# Patient Record
Sex: FEMALE | Race: BLACK OR AFRICAN AMERICAN | NOT HISPANIC OR LATINO | Employment: OTHER | URBAN - METROPOLITAN AREA
[De-identification: names, ages, dates, MRNs, and addresses within clinical notes are randomized per-mention and may not be internally consistent; named-entity substitution may affect disease eponyms.]

---

## 2018-07-02 ENCOUNTER — OFFICE VISIT (OUTPATIENT)
Dept: URGENT CARE | Facility: CLINIC | Age: 28
End: 2018-07-02
Payer: MEDICAID

## 2018-07-02 VITALS
HEART RATE: 83 BPM | OXYGEN SATURATION: 98 % | SYSTOLIC BLOOD PRESSURE: 108 MMHG | DIASTOLIC BLOOD PRESSURE: 74 MMHG | BODY MASS INDEX: 26.06 KG/M2 | WEIGHT: 138 LBS | TEMPERATURE: 98 F | RESPIRATION RATE: 18 BRPM | HEIGHT: 61 IN

## 2018-07-02 DIAGNOSIS — B07.9 VIRAL WART ON FINGER: Primary | ICD-10-CM

## 2018-07-02 PROCEDURE — 99203 OFFICE O/P NEW LOW 30 MIN: CPT | Mod: S$GLB,,, | Performed by: NURSE PRACTITIONER

## 2018-07-02 NOTE — PATIENT INSTRUCTIONS
Warts (Nongenital)  Warts are caused by a skin virus. They usually appear on the hands or feet. They are harmless and usually go away in about 2 to 3 years without treatment. With treatment, they go away in 1 to 3 months.  Home care  There are several methods you can use to treat warts at home.  The overnight treatment:  1. Soak affected area in hot water for 3 to 5 minutes. Make sure to test water beforehand so that it is not scalding. You should be able to comfortably place the affected area in water.  2. Trim dead tissue with a pumice stone or other tool your healthcare provider has advised, or that you feel comfortable using.  3. Apply an over-the-counter medicine that has salicylic acid. Cover the wart with an adhesive tape.  4. Repeat every third night as tolerated the wart goes away.  The duct tape method:  · Apply a small piece of duct tape to the wart for 6 days. The tape should cover the entire wart. At the end of the sixth day, remove the tape and soak in warm water. Then scrub the area gently with a pumice stone. Let the wart stay open to air overnight. This can be repeated for up to 2 months.  Banana peel:  · Soak the wart until the skin is soft, and then treat skin with a pumice stone. Apply a banana peel that is slightly larger than the wart. Secure with a bandage. The banana peel will keep the skin moist. The enzymes are thought to help kill the virus that causes warts.  Warts on the hands or feet are not very contagious. This means they are not spread to others by ordinary contact. But chewing or picking at the wart can cause it to spread to other places on your own skin.  Follow-up care  Follow up with your healthcare provider, or as advised. Let your provider know if the wart does not go away after 2 months of the above treatment.  When to seek medical advice  Get medical care right away if any of the following occur:  · A wart appears on the bottom of the foot or on the genitals  · Signs of  infection such as redness, swelling, increased pain, or pus  Date Last Reviewed: 8/1/2016  © 4067-2320 [employer]. [address] [place] [address], [place] [address]. All rights reserved. This information is not intended as a substitute for professional medical care. Always follow your healthcare professional's instructions.        Treating Warts     You and your healthcare provider can discuss whether your warts need to be treated.     You and your healthcare provider can talk about what treatment may be best for your wart or warts. To get rid of your warts, your healthcare provider may need to try more than one type of treatment. The methods described below are often used to treat warts.  Types of treatment  · Do nothing. Most warts will resolve within 2 years, even without treatment. So doing nothing is sometimes a good option. This is particularly true for smaller warts that are not causing symptoms.  · Cryotherapy (liquid nitrogen). This kills skin cells by freezing them. It kills the warts and destroys skin infected by the wart-causing virus. This is done in your healthcare providers office and will cause some discomfort. It may take several treatments over several weeks to get rid of the warts.  · Topical medicines. Prescribed topical medicines can be put on the skin. These are usually applied in the healthcare provider's office. But some prescriptions may be applied at home.  · Over-the-counter (OTC) topical treatments. OTC medicines that most often contain salicylic acid may be an option. These patches, liquids, and creams are used at home. The medicine is applied daily to the wart and nearby skin. It's usually left on overnight. The dead skin is filed down the next day. In 1 to 3 days, the procedure can be repeated. Topical treatments are sometimes combined with cryotherapy.  · Electrodessication and curettage (ED & C).  For this procedure, the healthcare provider applies numbing medicine to the  wart. Then the wart is scraped or cut off. This type of treatment is usually not the first line of therapy.  · Laser surgery.  This can vaporize wart tissue or destroy the blood vessels that feed the wart. This is done in the healthcare provider's office.  · Shots (injections). These can be used to treat warts that dont respond to other treatments, such as stubborn or painful warts around the nails. This is done in the healthcare providers office.  When to seek medical treatment  Its a good idea to have your healthcare provider check your warts. That way your provider can rule out any other skin problems. Sometimes a callous or a corn can look like a wart, but the treatments may differ. Treatment can also provide relief from warts that bleed, burn, hurt, or itch. Genital warts should always be treated. They can spread to other people through sexual contact. And they may cause genital or cervical cancer.  Getting good results  After having your warts treated, new warts may still appear. Dont be discouraged. Warts often come back. See your healthcare provider again to discuss this. Your provider can tell you about the treatments that most likely will help clear your skin of warts.   Date Last Reviewed: 2/1/2017  © 4504-2805 [employer]. [address] [place] [address], [place] [address]. All rights reserved. This information is not intended as a substitute for professional medical care. Always follow your healthcare professional's instructions.      -As we discussed you have been given a referral to follow up with a Dermatologist for cryotherapy to have the wart burned off and removed.  -You can start applying OTC wart removal ointment and pads to the affected area.  I have given you an [facility] doctor referral. If you don't hear from them in a few days call [phone]  -If symptoms worsen go to the ER.

## 2018-07-02 NOTE — PROGRESS NOTES
"Subjective:       Patient ID: Ifrah Valentine is a 28 y.o. female.    Vitals:  height is 5' 1" (1.549 m) and weight is 62.6 kg (138 lb). Her temperature is 98.2 °F (36.8 °C). Her blood pressure is 108/74 and her pulse is 83. Her respiration is 18 and oxygen saturation is 98%.     Chief Complaint: Abscess    The patient complains of left inner thumb skin lesion x 2 months. The lesions has darkened and hardened. Concerns for wart. Denies fever or chills. Single round raised plaque present.       Abscess   Chronicity:  NewProgression Since Onset: gradually worsening  Size:  Less than 2 cm  Location:  Hand  Associated Symptoms: no fever, no chills  Characteristics: dryness, peeling and swelling    Pain Scale:  8/10  Relieved by:  Nothing  Worsened by:  Nothing    Review of Systems   Constitution: Negative for chills and fever.   HENT: Negative for sore throat.    Eyes: Negative for blurred vision.   Cardiovascular: Negative for chest pain.   Respiratory: Negative for shortness of breath.    Skin: Negative for rash.   Musculoskeletal: Negative for back pain and joint pain.   Gastrointestinal: Negative for abdominal pain, diarrhea, nausea and vomiting.   Neurological: Negative for headaches.   Psychiatric/Behavioral: The patient is not nervous/anxious.    All other systems reviewed and are negative.      Objective:      Physical Exam   Constitutional: She is oriented to person, place, and time. She appears well-developed and well-nourished.   HENT:   Head: Normocephalic and atraumatic. Head is without abrasion, without contusion and without laceration.   Right Ear: External ear normal.   Left Ear: External ear normal.   Nose: Nose normal.   Mouth/Throat: Oropharynx is clear and moist.   Eyes: Conjunctivae, EOM and lids are normal. Pupils are equal, round, and reactive to light.   Neck: Trachea normal, full passive range of motion without pain and phonation normal. Neck supple.   Cardiovascular: Normal rate, regular rhythm " and normal heart sounds.    Pulmonary/Chest: Effort normal and breath sounds normal. No stridor. No respiratory distress.   Musculoskeletal: Normal range of motion.   Neurological: She is alert and oriented to person, place, and time.   Skin: Skin is warm, dry and intact. Capillary refill takes less than 2 seconds. Lesion (probable hard elevated lesions to the right thumb. conerns for wart) noted. No abrasion, no bruising, no burn, no ecchymosis, no laceration and no rash noted. No erythema.        Psychiatric: She has a normal mood and affect. Her speech is normal and behavior is normal. Judgment and thought content normal. Cognition and memory are normal.   Nursing note and vitals reviewed.      Assessment:       1. Viral wart on finger        Plan:         Viral wart on finger  -     Ambulatory referral to Dermatology      Patient Instructions       Warts (Nongenital)  Warts are caused by a skin virus. They usually appear on the hands or feet. They are harmless and usually go away in about 2 to 3 years without treatment. With treatment, they go away in 1 to 3 months.  Home care  There are several methods you can use to treat warts at home.  The overnight treatment:  1. Soak affected area in hot water for 3 to 5 minutes. Make sure to test water beforehand so that it is not scalding. You should be able to comfortably place the affected area in water.  2. Trim dead tissue with a pumice stone or other tool your healthcare provider has advised, or that you feel comfortable using.  3. Apply an over-the-counter medicine that has salicylic acid. Cover the wart with an adhesive tape.  4. Repeat every third night as tolerated the wart goes away.  The duct tape method:  · Apply a small piece of duct tape to the wart for 6 days. The tape should cover the entire wart. At the end of the sixth day, remove the tape and soak in warm water. Then scrub the area gently with a pumice stone. Let the wart stay open to air overnight.  This can be repeated for up to 2 months.  Banana peel:  · Soak the wart until the skin is soft, and then treat skin with a pumice stone. Apply a banana peel that is slightly larger than the wart. Secure with a bandage. The banana peel will keep the skin moist. The enzymes are thought to help kill the virus that causes warts.  Warts on the hands or feet are not very contagious. This means they are not spread to others by ordinary contact. But chewing or picking at the wart can cause it to spread to other places on your own skin.  Follow-up care  Follow up with your healthcare provider, or as advised. Let your provider know if the wart does not go away after 2 months of the above treatment.  When to seek medical advice  Get medical care right away if any of the following occur:  · A wart appears on the bottom of the foot or on the genitals  · Signs of infection such as redness, swelling, increased pain, or pus  Date Last Reviewed: 8/1/2016  © 3900-8114 [employer]. [address] [place] [address]. All rights reserved. This information is not intended as a substitute for professional medical care. Always follow your healthcare professional's instructions.        Treating Warts     You and your healthcare provider can discuss whether your warts need to be treated.     You and your healthcare provider can talk about what treatment may be best for your wart or warts. To get rid of your warts, your healthcare provider may need to try more than one type of treatment. The methods described below are often used to treat warts.  Types of treatment  · Do nothing. Most warts will resolve within 2 years, even without treatment. So doing nothing is sometimes a good option. This is particularly true for smaller warts that are not causing symptoms.  · Cryotherapy (liquid nitrogen). This kills skin cells by freezing them. It kills the warts and destroys skin infected by the wart-causing virus. This is done in  your healthcare providers office and will cause some discomfort. It may take several treatments over several weeks to get rid of the warts.  · Topical medicines. Prescribed topical medicines can be put on the skin. These are usually applied in the healthcare provider's office. But some prescriptions may be applied at home.  · Over-the-counter (OTC) topical treatments. OTC medicines that most often contain salicylic acid may be an option. These patches, liquids, and creams are used at home. The medicine is applied daily to the wart and nearby skin. It's usually left on overnight. The dead skin is filed down the next day. In 1 to 3 days, the procedure can be repeated. Topical treatments are sometimes combined with cryotherapy.  · Electrodessication and curettage (ED & C).  For this procedure, the healthcare provider applies numbing medicine to the wart. Then the wart is scraped or cut off. This type of treatment is usually not the first line of therapy.  · Laser surgery.  This can vaporize wart tissue or destroy the blood vessels that feed the wart. This is done in the healthcare provider's office.  · Shots (injections). These can be used to treat warts that dont respond to other treatments, such as stubborn or painful warts around the nails. This is done in the healthcare providers office.  When to seek medical treatment  Its a good idea to have your healthcare provider check your warts. That way your provider can rule out any other skin problems. Sometimes a callous or a corn can look like a wart, but the treatments may differ. Treatment can also provide relief from warts that bleed, burn, hurt, or itch. Genital warts should always be treated. They can spread to other people through sexual contact. And they may cause genital or cervical cancer.  Getting good results  After having your warts treated, new warts may still appear. Dont be discouraged. Warts often come back. See your healthcare provider again to  discuss this. Your provider can tell you about the treatments that most likely will help clear your skin of warts.   Date Last Reviewed: 2/1/2017  © 5829-7328 [employer]. [address] [place] [address], [place] [address]. All rights reserved. This information is not intended as a substitute for professional medical care. Always follow your healthcare professional's instructions.      -As we discussed you have been given a referral to follow up with a Dermatologist for cryotherapy to have the wart burned off and removed.  -You can start applying OTC wart removal ointment and pads to the affected area.  I have given you an [facility] doctor referral. If you don't hear from them in a few days call [phone]  -If symptoms worsen go to the ER.

## 2018-10-15 ENCOUNTER — OFFICE VISIT (OUTPATIENT)
Dept: URGENT CARE | Facility: CLINIC | Age: 28
End: 2018-10-15
Payer: MEDICAID

## 2018-10-15 VITALS
OXYGEN SATURATION: 99 % | SYSTOLIC BLOOD PRESSURE: 121 MMHG | WEIGHT: 138 LBS | BODY MASS INDEX: 26.06 KG/M2 | DIASTOLIC BLOOD PRESSURE: 80 MMHG | TEMPERATURE: 99 F | HEART RATE: 110 BPM | HEIGHT: 61 IN | RESPIRATION RATE: 20 BRPM

## 2018-10-15 DIAGNOSIS — S62.394A CLOSED NONDISPLACED FRACTURE OF OTHER PART OF FOURTH METACARPAL BONE OF RIGHT HAND, INITIAL ENCOUNTER: Primary | ICD-10-CM

## 2018-10-15 DIAGNOSIS — M79.641 RIGHT HAND PAIN: ICD-10-CM

## 2018-10-15 PROCEDURE — 73130 X-RAY EXAM OF HAND: CPT | Mod: RT,S$GLB,, | Performed by: RADIOLOGY

## 2018-10-15 PROCEDURE — 99214 OFFICE O/P EST MOD 30 MIN: CPT | Mod: S$GLB,,, | Performed by: NURSE PRACTITIONER

## 2018-10-15 NOTE — PROGRESS NOTES
"Subjective:       Patient ID: Ifrah Valentine is a 28 y.o. female.    Vitals:  height is 5' 1" (1.549 m) and weight is 62.6 kg (138 lb). Her temperature is 98.5 °F (36.9 °C). Her blood pressure is 121/80 and her pulse is 110. Her respiration is 20 and oxygen saturation is 99%.     Chief Complaint: Finger Injury    About an hour ago pt hit the wall with her right hand and now her knuckles are swollen and her hand in in a lot of pain.      Hand Injury    Her dominant hand is their right hand. The incident occurred less than 1 hour ago. The incident occurred at home. The pain is present in the right hand and right fingers. The quality of the pain is described as aching. The pain is at a severity of 10/10. Pertinent negatives include no chest pain or numbness. Nothing aggravates the symptoms. She has tried nothing for the symptoms.     Review of Systems   Constitution: Negative for weakness and malaise/fatigue.   HENT: Negative for nosebleeds.    Cardiovascular: Negative for chest pain and syncope.   Respiratory: Negative for shortness of breath.    Musculoskeletal: Positive for joint pain and joint swelling. Negative for back pain and neck pain.   Gastrointestinal: Negative for abdominal pain.   Genitourinary: Negative for hematuria.   Neurological: Negative for dizziness and numbness.   All other systems reviewed and are negative.      Objective:      Physical Exam   Constitutional: She is oriented to person, place, and time. She appears well-developed and well-nourished. She is cooperative.  Non-toxic appearance. She does not appear ill. No distress.   HENT:   Head: Normocephalic and atraumatic.   Right Ear: Hearing, tympanic membrane and ear canal normal.   Left Ear: Hearing, tympanic membrane and ear canal normal.   Nose: No mucosal edema, rhinorrhea or nasal deformity. No epistaxis. Right sinus exhibits no maxillary sinus tenderness and no frontal sinus tenderness. Left sinus exhibits no maxillary sinus tenderness " and no frontal sinus tenderness.   Mouth/Throat: Uvula is midline and mucous membranes are normal. No trismus in the jaw. Normal dentition. No uvula swelling. No posterior oropharyngeal erythema.   Eyes: Conjunctivae and lids are normal. Right eye exhibits no discharge. Left eye exhibits no discharge. No scleral icterus.   Sclera clear bilat   Neck: Trachea normal, normal range of motion, full passive range of motion without pain and phonation normal. Neck supple.   Cardiovascular: Normal rate, regular rhythm and normal pulses.   Pulmonary/Chest: No respiratory distress.   Abdominal: Normal appearance. She exhibits no distension, no pulsatile midline mass and no mass. There is no tenderness.   Musculoskeletal: Normal range of motion. She exhibits edema and tenderness. She exhibits no deformity.   Neurological: She is alert and oriented to person, place, and time. She exhibits normal muscle tone. Coordination normal.   Skin: Skin is warm, dry and intact. She is not diaphoretic. No pallor.   Psychiatric: She has a normal mood and affect. Her speech is normal and behavior is normal. Judgment and thought content normal. Cognition and memory are normal.   Nursing note and vitals reviewed.      X-ray Hand 3 View Right    Result Date: 10/15/2018  EXAMINATION: XR HAND COMPLETE 3 VIEW RIGHT CLINICAL HISTORY: Pain in right hand TECHNIQUE: PA, lateral, and oblique views of the right hand were performed. COMPARISON: None FINDINGS: There is fracture involving the distal aspect of the right 4th metacarpal bone with palmar angulation of the distal fracture fragment.  This appears acute.  There is no evidence for dislocation.  Soft tissues are unremarkable.     Fracture of the distal aspect of the right 4th metacarpal bone with palmar angulation of the distal fracture fragment. Electronically signed by: Demetri De Los Santos MD Date:    10/15/2018 Time:    09:52      Ulnar gutter splint applied to right hand--+cms after  application      Assessment:       1. Closed nondisplaced fracture of other part of fourth metacarpal bone of right hand, initial encounter    2. Right hand pain        Plan:       Follow up with Orthopedics  Gave pt [facility] orthopedics number as well  Patient Instructions     Closed Hand Fracture (Adult)  You have a fracture, or broken bone, in your hand. This may be a small crack or chip in the bone. Or it may be a major break with the broken parts pushed out of place. A closed fracture means that the broken bone has not gone through the skin. A hand fracture is treated with a splint or cast. It usually takes 4 to 6 weeks to heal. Severe injuries may require surgery.     Home care  · Keep your arm elevated to reduce pain and swelling. When sitting or lying down, elevate your arm above the level of your heart. You can do this by placing your arm on a pillow that rests on your chest or on a pillow at your side. This is most important during the first 48 hours after injury.  · Apply an ice pack over the injured area for no more than 15 to 20 minutes. Do this every 1 to 2 hours for the first 24 to 48 hours. Continue with ice packs as needed to ease pain and swelling. To make an ice pack, put ice cubes in a plastic bag that seals at the top. Wrap the bag in a clean, thin towel or cloth. Never put ice or an ice pack directly on the skin. You can place the ice pack inside the sling and directly over the cast or splint. As the ice melts, be careful that the cast or splint doesnt get wet.  · Keep the cast or splint completely dry at all times. Bathe with your cast or splint out of the water, protected with 2 large plastic bags. Place 1 bag outside the other. Tape each bag with duct tape at the top end. If a fiberglass cast or splint gets wet, dry it with a hair dryer on a cool setting.  · You may use over-the-counter pain medicine to control pain, unless another pain medicine was prescribed. If you have chronic liver or  kidney disease or ever had a stomach ulcer or GI bleeding, talk with your provider beforeusing these medicines.  Follow-up care  Follow up with your healthcare provider within 1 week, or as advised. This is to be sure the bone is healing properly. If you were given a splint, it may be changed to a cast at your follow-up visit.  If X-rays were taken, you will be told of any new findings that may affect your care.  When to seek medical advice  Call your healthcare provider right away if any of these occur:  · The plaster cast or splint becomes wet or soft  · The fiberglass cast or splint stays wet for more than 24 hours  · The cast has a bad smell  · The plaster cast or splint becomes loose  · There is increased tightness or pain under the cast or splint  · The fingers on your injured hand become swollen, cold, blue, numb, or tingly  Date Last Reviewed: 12/3/2015  © 4138-8108 [employer]. [address] [place] [address]. All rights reserved. This information is not intended as a substitute for professional medical care. Always follow your healthcare professional's instructions.            Closed nondisplaced fracture of other part of fourth metacarpal bone of right hand, initial encounter  -     Ambulatory referral to Orthopedics    Right hand pain  -     X-Ray Hand 3 View Right; Future; Expected date: 10/15/2018

## 2018-10-15 NOTE — PATIENT INSTRUCTIONS
Closed Hand Fracture (Adult)  You have a fracture, or broken bone, in your hand. This may be a small crack or chip in the bone. Or it may be a major break with the broken parts pushed out of place. A closed fracture means that the broken bone has not gone through the skin. A hand fracture is treated with a splint or cast. It usually takes 4 to 6 weeks to heal. Severe injuries may require surgery.     Home care  · Keep your arm elevated to reduce pain and swelling. When sitting or lying down, elevate your arm above the level of your heart. You can do this by placing your arm on a pillow that rests on your chest or on a pillow at your side. This is most important during the first 48 hours after injury.  · Apply an ice pack over the injured area for no more than 15 to 20 minutes. Do this every 1 to 2 hours for the first 24 to 48 hours. Continue with ice packs as needed to ease pain and swelling. To make an ice pack, put ice cubes in a plastic bag that seals at the top. Wrap the bag in a clean, thin towel or cloth. Never put ice or an ice pack directly on the skin. You can place the ice pack inside the sling and directly over the cast or splint. As the ice melts, be careful that the cast or splint doesnt get wet.  · Keep the cast or splint completely dry at all times. Bathe with your cast or splint out of the water, protected with 2 large plastic bags. Place 1 bag outside the other. Tape each bag with duct tape at the top end. If a fiberglass cast or splint gets wet, dry it with a hair dryer on a cool setting.  · You may use over-the-counter pain medicine to control pain, unless another pain medicine was prescribed. If you have chronic liver or kidney disease or ever had a stomach ulcer or GI bleeding, talk with your provider beforeusing these medicines.  Follow-up care  Follow up with your healthcare provider within 1 week, or as advised. This is to be sure the bone is healing properly. If you were given a splint,  it may be changed to a cast at your follow-up visit.  If X-rays were taken, you will be told of any new findings that may affect your care.  When to seek medical advice  Call your healthcare provider right away if any of these occur:  · The plaster cast or splint becomes wet or soft  · The fiberglass cast or splint stays wet for more than 24 hours  · The cast has a bad smell  · The plaster cast or splint becomes loose  · There is increased tightness or pain under the cast or splint  · The fingers on your injured hand become swollen, cold, blue, numb, or tingly  Date Last Reviewed: 12/3/2015  © 3911-8026 [employer]. [address] [place] [address], [place] [address]. All rights reserved. This information is not intended as a substitute for professional medical care. Always follow your healthcare professional's instructions.

## 2018-10-16 ENCOUNTER — PES CALL (OUTPATIENT)
Dept: ADMINISTRATIVE | Facility: CLINIC | Age: 28
End: 2018-10-16

## 2019-12-26 ENCOUNTER — TELEPHONE (OUTPATIENT)
Dept: NEUROLOGY | Facility: CLINIC | Age: 29
End: 2019-12-26

## 2019-12-26 NOTE — TELEPHONE ENCOUNTER
Left message to offer appointment for Jan 9. Advised that if she is having constant seizures she should go to the ER

## 2019-12-26 NOTE — TELEPHONE ENCOUNTER
----- Message from Jerod Spencer sent at 12/26/2019  7:47 AM CST -----  Contact: pt   Pt would like a call back regarding scheduling np appointment pt is having constant seizure and is concerned pt has medicaid but is willing to pay out of pocket. Insisted on message being sent     Pt can be reached at [phone]
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
## 2020-01-09 ENCOUNTER — OFFICE VISIT (OUTPATIENT)
Dept: NEUROLOGY | Facility: CLINIC | Age: 30
End: 2020-01-09
Payer: MEDICAID

## 2020-01-09 VITALS
HEART RATE: 88 BPM | DIASTOLIC BLOOD PRESSURE: 82 MMHG | BODY MASS INDEX: 26.06 KG/M2 | HEIGHT: 61 IN | WEIGHT: 138 LBS | SYSTOLIC BLOOD PRESSURE: 124 MMHG

## 2020-01-09 DIAGNOSIS — R56.9 SEIZURE: Primary | ICD-10-CM

## 2020-01-09 PROCEDURE — 99205 PR OFFICE/OUTPT VISIT, NEW, LEVL V, 60-74 MIN: ICD-10-PCS | Mod: S$PBB,,, | Performed by: PSYCHIATRY & NEUROLOGY

## 2020-01-09 PROCEDURE — 99999 PR PBB SHADOW E&M-EST. PATIENT-LVL II: ICD-10-PCS | Mod: PBBFAC,,, | Performed by: PSYCHIATRY & NEUROLOGY

## 2020-01-09 PROCEDURE — 99999 PR PBB SHADOW E&M-EST. PATIENT-LVL II: CPT | Mod: PBBFAC,,, | Performed by: PSYCHIATRY & NEUROLOGY

## 2020-01-09 PROCEDURE — 99212 OFFICE O/P EST SF 10 MIN: CPT | Mod: PBBFAC | Performed by: PSYCHIATRY & NEUROLOGY

## 2020-01-09 PROCEDURE — 99205 OFFICE O/P NEW HI 60 MIN: CPT | Mod: S$PBB,,, | Performed by: PSYCHIATRY & NEUROLOGY

## 2020-01-09 RX ORDER — LEVETIRACETAM 500 MG/1
500 TABLET ORAL
COMMUNITY
Start: 2019-08-20 | End: 2021-02-03 | Stop reason: SDUPTHER

## 2020-01-11 PROBLEM — R56.9 SEIZURE: Status: ACTIVE | Noted: 2020-01-11

## 2020-01-12 NOTE — ASSESSMENT & PLAN NOTE
28yo F with recent onset of seizures - no risk factors  - triggered by sleep deprivation and possibly etoh    - on LEV 500mg bid - reports side-effects of depression and anger    Plan:   - EMU evaluation to characterize and quantify seizures   - continue LEV 500mg bid for now - until EMU   - change to ZNS - pt wants a q day AED  - avoid triggers  - Sz watch would be appropriate as patient lives alone (with her 5yo daughter)    Seizure log  Seizure precautions/restrictions    Plan of care was discussed in detail with patient and her mother.

## 2020-01-27 ENCOUNTER — TELEPHONE (OUTPATIENT)
Dept: NEUROLOGY | Facility: CLINIC | Age: 30
End: 2020-01-27

## 2020-01-27 NOTE — TELEPHONE ENCOUNTER
Spoke with patient about the need to reschedule her emu. Patient verbalized understanding. Patient upset about the need to change dates.

## 2020-01-30 ENCOUNTER — TELEPHONE (OUTPATIENT)
Dept: NEUROLOGY | Facility: CLINIC | Age: 30
End: 2020-01-30

## 2020-02-03 ENCOUNTER — TELEPHONE (OUTPATIENT)
Dept: NEUROLOGY | Facility: CLINIC | Age: 30
End: 2020-02-03

## 2020-02-04 ENCOUNTER — HOSPITAL ENCOUNTER (INPATIENT)
Facility: HOSPITAL | Age: 30
LOS: 2 days | Discharge: HOME | DRG: 101 | End: 2020-02-06
Attending: PSYCHIATRY & NEUROLOGY | Admitting: PSYCHIATRY & NEUROLOGY
Payer: MEDICAID

## 2020-02-04 DIAGNOSIS — G40.219 COMPLEX PARTIAL EPILEPSY WITH GENERALIZATION AND WITH INTRACTABLE EPILEPSY: ICD-10-CM

## 2020-02-04 DIAGNOSIS — R56.9 SEIZURES: Primary | ICD-10-CM

## 2020-02-04 PROCEDURE — 25000003 PHARM REV CODE 250: Performed by: STUDENT IN AN ORGANIZED HEALTH CARE EDUCATION/TRAINING PROGRAM

## 2020-02-04 PROCEDURE — 95720 PR EEG, W/VIDEO, CONT RECORD, I&R, >12<26 HRS: ICD-10-PCS | Mod: ,,, | Performed by: PSYCHIATRY & NEUROLOGY

## 2020-02-04 PROCEDURE — 95714 VEEG EA 12-26 HR UNMNTR: CPT

## 2020-02-04 PROCEDURE — 95700 EEG CONT REC W/VID EEG TECH: CPT

## 2020-02-04 PROCEDURE — 99223 1ST HOSP IP/OBS HIGH 75: CPT | Mod: ,,, | Performed by: PSYCHIATRY & NEUROLOGY

## 2020-02-04 PROCEDURE — 11000001 HC ACUTE MED/SURG PRIVATE ROOM

## 2020-02-04 PROCEDURE — 95720 EEG PHY/QHP EA INCR W/VEEG: CPT | Mod: ,,, | Performed by: PSYCHIATRY & NEUROLOGY

## 2020-02-04 PROCEDURE — 99223 PR INITIAL HOSPITAL CARE,LEVL III: ICD-10-PCS | Mod: ,,, | Performed by: PSYCHIATRY & NEUROLOGY

## 2020-02-04 PROCEDURE — S4991 NICOTINE PATCH NONLEGEND: HCPCS | Performed by: STUDENT IN AN ORGANIZED HEALTH CARE EDUCATION/TRAINING PROGRAM

## 2020-02-04 RX ORDER — SODIUM CHLORIDE 0.9 % (FLUSH) 0.9 %
10 SYRINGE (ML) INJECTION
Status: DISCONTINUED | OUTPATIENT
Start: 2020-02-04 | End: 2020-02-06 | Stop reason: HOSPADM

## 2020-02-04 RX ORDER — DOCUSATE SODIUM 100 MG/1
100 CAPSULE, LIQUID FILLED ORAL 2 TIMES DAILY PRN
Status: DISCONTINUED | OUTPATIENT
Start: 2020-02-04 | End: 2020-02-06 | Stop reason: HOSPADM

## 2020-02-04 RX ORDER — DOCUSATE SODIUM 100 MG/1
100 CAPSULE, LIQUID FILLED ORAL 2 TIMES DAILY
Status: DISCONTINUED | OUTPATIENT
Start: 2020-02-04 | End: 2020-02-04

## 2020-02-04 RX ORDER — IBUPROFEN 200 MG
1 TABLET ORAL DAILY
Status: DISCONTINUED | OUTPATIENT
Start: 2020-02-04 | End: 2020-02-06 | Stop reason: HOSPADM

## 2020-02-04 RX ORDER — ONDANSETRON 4 MG/1
4 TABLET, FILM COATED ORAL EVERY 6 HOURS PRN
Status: DISCONTINUED | OUTPATIENT
Start: 2020-02-04 | End: 2020-02-06 | Stop reason: HOSPADM

## 2020-02-04 RX ORDER — LORAZEPAM 2 MG/ML
2 INJECTION INTRAMUSCULAR
Status: DISCONTINUED | OUTPATIENT
Start: 2020-02-04 | End: 2020-02-06 | Stop reason: HOSPADM

## 2020-02-04 RX ORDER — ACETAMINOPHEN 325 MG/1
650 TABLET ORAL EVERY 6 HOURS PRN
Status: DISCONTINUED | OUTPATIENT
Start: 2020-02-04 | End: 2020-02-06 | Stop reason: HOSPADM

## 2020-02-04 RX ADMIN — NICOTINE 1 PATCH: 14 PATCH TRANSDERMAL at 06:02
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
## 2020-02-04 NOTE — ASSESSMENT & PLAN NOTE
-Loaded with Keppra, starting her home dose 500 mg bid while titrating up on lamotrigine  -Lamotrigine 25 mg qd x 2 weeks, followed by 50 mg qd x 2 weeks, 100 mg qd x 2 weeks, then 200 mg qd thereafter.  Patient given calendar for titration schedule.   -Continue vEEG  -Continue seizure precautions  -IV lorazepam 2 mg prn generalized seizure > 5 minutes or multiple seizures w/o return to cognitive baseline within 30 minutes  -Follow up with Dr. Post

## 2020-02-05 PROCEDURE — 95720 EEG PHY/QHP EA INCR W/VEEG: CPT | Mod: ,,, | Performed by: PSYCHIATRY & NEUROLOGY

## 2020-02-05 PROCEDURE — 25000003 PHARM REV CODE 250: Performed by: STUDENT IN AN ORGANIZED HEALTH CARE EDUCATION/TRAINING PROGRAM

## 2020-02-05 PROCEDURE — 11000001 HC ACUTE MED/SURG PRIVATE ROOM

## 2020-02-05 PROCEDURE — 95714 VEEG EA 12-26 HR UNMNTR: CPT

## 2020-02-05 PROCEDURE — 99233 PR SUBSEQUENT HOSPITAL CARE,LEVL III: ICD-10-PCS | Mod: ,,, | Performed by: PSYCHIATRY & NEUROLOGY

## 2020-02-05 PROCEDURE — 63600175 PHARM REV CODE 636 W HCPCS: Performed by: STUDENT IN AN ORGANIZED HEALTH CARE EDUCATION/TRAINING PROGRAM

## 2020-02-05 PROCEDURE — 94761 N-INVAS EAR/PLS OXIMETRY MLT: CPT

## 2020-02-05 PROCEDURE — S4991 NICOTINE PATCH NONLEGEND: HCPCS | Performed by: STUDENT IN AN ORGANIZED HEALTH CARE EDUCATION/TRAINING PROGRAM

## 2020-02-05 PROCEDURE — 95720 PR EEG, W/VIDEO, CONT RECORD, I&R, >12<26 HRS: ICD-10-PCS | Mod: ,,, | Performed by: PSYCHIATRY & NEUROLOGY

## 2020-02-05 PROCEDURE — 99233 SBSQ HOSP IP/OBS HIGH 50: CPT | Mod: ,,, | Performed by: PSYCHIATRY & NEUROLOGY

## 2020-02-05 PROCEDURE — 95700 EEG CONT REC W/VID EEG TECH: CPT

## 2020-02-05 RX ORDER — HYDROCODONE BITARTRATE AND ACETAMINOPHEN 5; 325 MG/1; MG/1
1 TABLET ORAL EVERY 6 HOURS PRN
Status: DISCONTINUED | OUTPATIENT
Start: 2020-02-05 | End: 2020-02-06 | Stop reason: HOSPADM

## 2020-02-05 RX ORDER — MIDAZOLAM HYDROCHLORIDE 5 MG/ML
5 INJECTION INTRAMUSCULAR; INTRAVENOUS ONCE
Status: COMPLETED | OUTPATIENT
Start: 2020-02-05 | End: 2020-02-05

## 2020-02-05 RX ORDER — KETOROLAC TROMETHAMINE 30 MG/ML
30 INJECTION, SOLUTION INTRAMUSCULAR; INTRAVENOUS ONCE
Status: COMPLETED | OUTPATIENT
Start: 2020-02-05 | End: 2020-02-05

## 2020-02-05 RX ORDER — LEVETIRACETAM 500 MG/1
500 TABLET ORAL 2 TIMES DAILY
Status: DISCONTINUED | OUTPATIENT
Start: 2020-02-05 | End: 2020-02-06 | Stop reason: HOSPADM

## 2020-02-05 RX ORDER — LAMOTRIGINE 25 MG/1
25 TABLET ORAL DAILY
Status: DISCONTINUED | OUTPATIENT
Start: 2020-02-05 | End: 2020-02-06 | Stop reason: HOSPADM

## 2020-02-05 RX ADMIN — ONDANSETRON HYDROCHLORIDE 4 MG: 4 TABLET, FILM COATED ORAL at 05:02

## 2020-02-05 RX ADMIN — NICOTINE 1 PATCH: 14 PATCH TRANSDERMAL at 09:02

## 2020-02-05 RX ADMIN — ACETAMINOPHEN 650 MG: 325 TABLET ORAL at 02:02

## 2020-02-05 RX ADMIN — LAMOTRIGINE 25 MG: 25 TABLET ORAL at 08:02

## 2020-02-05 RX ADMIN — LEVETIRACETAM 500 MG: 500 TABLET ORAL at 08:02

## 2020-02-05 RX ADMIN — MIDAZOLAM HYDROCHLORIDE 5 MG: 5 INJECTION, SOLUTION INTRAMUSCULAR; INTRAVENOUS at 10:02

## 2020-02-05 RX ADMIN — KETOROLAC TROMETHAMINE 30 MG: 30 INJECTION, SOLUTION INTRAMUSCULAR; INTRAVENOUS at 10:02

## 2020-02-05 RX ADMIN — ACETAMINOPHEN 650 MG: 325 TABLET ORAL at 09:02

## 2020-02-05 RX ADMIN — ONDANSETRON HYDROCHLORIDE 4 MG: 4 TABLET, FILM COATED ORAL at 09:02
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
## 2020-02-05 NOTE — HOSPITAL COURSE
02/04/20-02/05/20:  Seizure overnight ~ 03:26 AM 02/05/20.  Additional seizures after 7 am.  R temporal onset with generalization.  IV midazolam given, starting lamotrigine but will continue levetiracetam until up to appropriate dose on lamotrigine.  02/05/20-02/06/20:  Another generalized seizure 18:27 with some additional seizures early morning 2/6--5:50, 6:20.

## 2020-02-05 NOTE — PLAN OF CARE
PT RESTING AT THIS TIME. DIFFICULTY TO AROUSE AT THIS TIME. WILL RETURN LATER.       02/05/20 1244   Discharge Assessment   Assessment Type Discharge Planning Assessment   Assessment information obtained from? Medical Record

## 2020-02-05 NOTE — PLAN OF CARE
Problem: Adult Inpatient Plan of Care  Goal: Plan of Care Review  Outcome: Ongoing, Progressing     Problem: Adult Inpatient Plan of Care  Goal: Optimal Comfort and Wellbeing  Outcome: Ongoing, Progressing

## 2020-02-05 NOTE — PROGRESS NOTES
[facility]  Epilepsy Monitoring Unit  Daily Progress Note    Date: 1/9/20  Patient Name: Ifrah Valentine   MRN: [id number]   PCP: Primary Doctor No    Subjective:     Interval History:  Patient had a seizure around 03:26 am last night then two more events this morning, after 7 am and will be reported with tomorrow's EEG report.  Events with R temporal focus.  Discussed starting lamotrigine.  Parents and sister at bedside.  Patient given IV midazolam due to several seizures within the course of ~ 4 hours.  Will follow up with Dr. Post.    HOSPITAL COURSE:  02/04/20-02/05/20:  Seizure overnight ~ 03:26 AM 02/05/20.  Additional seizures after 7 am.  R temporal onset with generalization.  IV midazolam given, starting lamotrigine but will continue levetiracetam until up to appropriate dose on lamotrigine.    PAST MEDICAL HISTORY:  Past Medical History:   Diagnosis Date    Idiopathic generalized epilepsy     Onset April 2019     PAST SURGICAL HISTORY:  History reviewed. No pertinent surgical history.  CURRENT MEDS:  Current Facility-Administered Medications   Medication Dose Route Frequency Provider Last Rate Last Dose    acetaminophen tablet 650 mg  650 mg Oral Q6H PRN Padmini Ch MD   650 mg at 02/05/20 0929    docusate sodium capsule 100 mg  100 mg Oral BID PRN Padmini Ch MD        HYDROcodone-acetaminophen 5-325 mg per tablet 1 tablet  1 tablet Oral Q6H PRN Padmini Ch MD        lamoTRIgine tablet 25 mg  25 mg Oral Daily Padmini Ch MD        levETIRAcetam tablet 500 mg  500 mg Oral BID Padmini Ch MD        lorazepam injection 2 mg  2 mg Intravenous Q15 Min PRN Padmini Ch MD        nicotine 14 mg/24 hr 1 patch  1 patch Transdermal Daily Padmini Ch MD   1 patch at 02/05/20 0935    ondansetron tablet 4 mg  4 mg Oral Q6H PRN Padmini Ch MD   4 mg at 02/05/20 1717    sodium chloride  0.9% flush 10 mL  10 mL Intravenous PRN Padmini Ch MD         ALLERGIES:  Review of patient's allergies indicates:  No Known Allergies  FAMILY HISTORY:  History reviewed. No pertinent family history.  SOCIAL HISTORY:  Social History     Tobacco Use    Smoking status: Current Every Day Smoker     Packs/day: 0.50     Years: 10.00     Pack years: 5.00     Types: Cigarettes    Smokeless tobacco: Never Used   Substance Use Topics    Alcohol use: Yes     Frequency: 2-4 times a month     Drinks per session: 3 or 4     Binge frequency: Never    Drug use: Yes     Frequency: 3.0 times per week     Types: Marijuana     Review of Systems:  12 system review of systems is negative except as noted in HPI.      Objective:     Vitals:    02/05/20 1104 02/05/20 1231 02/05/20 1239 02/05/20 1506   BP:   (!) 103/56    BP Location:       Patient Position:       Pulse: 78 79 62 74   Resp:  16 18    Temp:   98.5 °F (36.9 °C)    TempSrc:   Oral    SpO2:  98% 96%      General: NAD, well nourished   Eyes: no tearing, discharge, no erythema   ENT: moist mucous membranes of the oral cavity, no erythema or exudate; +tongue lac  Neck: Supple, Full range of motion without nuchal rigidity  Cardiovascular: Warm and well perfused, no LE edema  Lungs: Normal work of breathing, normal chest wall excursions  Skin: No rash, lesions, or breakdown on exposed skin  Psychiatry: Mood and affect are appropriate   Abdomen: soft, non distended  Extremeties: No cyanosis, clubbing or edema.    Neurological   MENTAL STATUS: Somnolent s/p seizure activity this am.  Oriented to self, location, date.  Follows commands.  CRANIAL NERVES: PERRL. EOMI. Facial movement intact, symmetric. Hearing grossly intact. Full shoulder shrug bilaterally. Tongue protrudes midline, palate raises symmetrically.  SENSORY: Sensation is intact to light touch throughout.   MOTOR: Normal bulk and tone. No pronator drift. Poor effort 2/2 muscle soreness but 5/5 throughout  with best effort.   CEREBELLAR/COORDINATION/GAIT: Gait deferred 2/2 seizure risk.  No resting tremor or myoclonus.    Labs: Reviewed. Noted below.  Imaging: Reviewed imaging prior to admission per H&P.  No imaging on current admission.    Diagnostic EEG Results:  02/04/20-02/05/20 Date: EEG Background: Continuous, symmetric background with no clear interictal discharges.  Event 03:26 with R temporal onset and secondary generalization.     Activation Procedures: None    Seizures/Events: 02/05/20 03:26:14  Start Time: 02/05/20 03:26:14, Stop Time 03:28:04  Vitals:  Stable  Clinical Symptoms:  Patient sleeping, shaking in RUE with eyes open, head turn L w/ ictal cry and figure four posturing.  Unable to see BLE, reportedly extended.  Subjective Symptoms:  Pt does not remember seizure, has tongue lac after    Assessment:      This is Ifrah Valentine, 30 y.o. female presenting for evaluation of seizure focus and AED adjustment.    Plan:      Problem List as of 2/5/2020 Reviewed: 1/11/2020  9:17 PM by Monisha Post MD       Unprioritized    Seizures    Complex partial epilepsy with generalization and with intractable epilepsy    Current Assessment & Plan 1/9/2020 Hospital Encounter Written 2/4/2020  4:12 PM by Padmini Ch MD     -Concern for idiopathic generalized epilepsy with unknown seizure burden, not responding to Keppra  -Resuming levetiracetam with plan to titrate up on lamotrigine prior to weaning off of Keppra  -Continue vEEG  -Continue seizure precautions  -IV lorazepam 2 mg prn generalized seizure > 5 minutes or multiple seizures w/o return to cognitive baseline within 30 minutes  -If stable on EEG and clinically x 24 hours, will plan for discharge 2/6/20 with lamotrigine, levetiracetam, and ativan bridge with follow up planned w/ Dr. Post

## 2020-02-05 NOTE — PROCEDURES
EPILEPSY MONITORING UNIT  EEG/VIDEO TELEMETRY REPORT  DATE OF SERVICE: 2/4-6/2020  EEG NUMBER: EMU -1,2,3,4  REQUESTED BY:   LOCATION OF SERVICE:     METHODOLOGY      Electroencephalographic (EEG) is recorded with electrodes placed according to the International 10-20 placement system.  Thirty Two (32) channels of digital signal including the T1 and T2 electrodes are simultaneously recorded from the scalp and also including EKG, EMG  and/or eye movement monitors.  Recording band pass was 0.1 to 512 hz.  Digital video recording of the patient is simultaneously recorded with the EEG.  The patient is instructed report clinical symptoms which may occur during the recording session.  EEG and video recording is stored and archived in digital format. Activation procedures which include photic stimulation, hyperventilation and instructing patients to perform simple task are done in selected patients.       The EEG is displayed on a monitor screen and can be reformatted into different montages for evaluation.  The entire recoding is submitted for computer assisted analysis to detect spike and electrographic seizure activity.  The entire recording is visually reviewed and the times identified by computer analysis as being spikes or seizures are reviewed again.  Compresses spectral analysis (CSA) is also performed on the activity recorded from each individual channel.  This is displayed as a power display of frequencies from 0 to 30 Hz over time.   The CSA analysis is done and displayed continuously.  This is reviewed for asymmetries in power between homologous areas of the scalp and for presence of changes in power which can be seen when seizures occur.  Sections of suspected abnormalities on the CSA is then compared with the original EEG recording.      [employer] software was also utilized in the review of this study.  This software suite analyzes the EEG recording in multiple domains.  Coherence and rhythmicity is  computed to identify EEG sections which may contain organized seizures.  Each channel undergoes analysis to detect presence of spike and sharp waves which have special and morphological characteristic of epileptic activity.  The routine EEG recording is converted from spacial into frequency domain.  This is then displayed comparing homologous areas to identify areas of significant asymmetry.  Algorithm to identify non-cortically generated artifact is used to separate eye movement, EMG and other artifact from the EEG.      ELECTROENCEPHALOGRAM  DAY 1:  Recording Times  Start on 2/4/2020 at 15:34:44 stop on 2/5/2020 at 08:37:57  A total of 16:51:32 hours of EEG/Video telemetry was recorded.  Events/Seizures recorded  Seizure 1 - on 2/5/2020 start at 03:26:22 stopped 03:27:22  Seizure 2 - on 2/5/2020 start at 07:57:55 stopped 07:29:41  Background activity:   The background rhythm was characterized by alpha and anterior dominant beta activity with a 10-11Hz posterior dominant alpha rhythm at 30-70 microvolts.   Symmetry and continuity: the background was continuous and symmetric  Sleep:   Normal sleep transients including sleep spindles, K complexes, vertex waves and POSTS were seen.  Activation procedures:   None  Abnormal activity:   There are occasional right anterior temporal sharp waves at T2, F8 in sleep.  CLINICAL DESCRIPTION OF EVENTS/SEIZURES:  Seizure 1:  Patient is lying in bed on her right side without face visible when she develops forced turn of head and body to the left prior to generalized convulsion.  On EEG there is onset of rhythmic delta activity in the right anterior temporal region with evolution and secondary generalization.  Seizure 2:  Patient is recumbent in bed with both hand on her head talking to the nurse when she begins chewing motions as well as right hand automatisms and becomes verbally unresponsive prior to forced head turn to the left and generalized convulsions.  On EEG there is  onset of rhythmic delta activity in the right anterior temporal region with evolution and secondary generalization.    DAY 2:  Recording Times  Start on 2/5/2020 at 10:02:17 stop on 2/6/2020 at 07:00:01  A total of 20:11:30 hours of EEG/Video telemetry was recorded.  Events/Seizures recorded  Seizure 3 - on 2/5/2020 start at 17:52:19 stopped 17:53:00  Seizure 4 - on 2/5/2020 start at 18:20:08 stopped 18:23:27  Seizure 5 - on 2/5/2020 start at 18:53:36 stopped 18:55:15  Background activity:   The background rhythm was characterized by alpha and anterior dominant beta activity with a 10-11Hz posterior dominant alpha rhythm at 30-70 microvolts.   Symmetry and continuity: the background was continuous and symmetric  Sleep:   Normal sleep transients including sleep spindles, K complexes, vertex waves and POSTS were seen.  Activation procedures:   None  Abnormal activity:   There are occasional right anterior temporal sharp waves at T2, F8 in sleep.  CLINICAL DESCRIPTION OF EVENTS/SEIZURES:  Seizure 3 - The patient is asleep on her right side when she opens her eyes with some arm movements noted under blanket.  Mouth is not visible making it unclear if there are associated oral automatisms.  Seizure 4 - The patient is asleep on her right side at onset when she awakens and abruptly looks left well into the discharge.  Head briefly relaxes before again turning left an transitioning to generalized convulsion with suggestion of figure four posturing under the blanket with left arm extended.  Seizure 5 - The patient opens eyes  from sleep and begins lip smacking then slow head turn left followed by ictal cry and generalized convulsion.   ELECTROGRAPHIC:  Seizure 3 - There is associated right temporal rhythmic theta activity.  Seizure 4 - There is associated right temporal rhythmic theta activity for approximately a minute prior to diffuse muscle artifact consistent with generalization.  Seizure 5 - Following electrographic  sleep there is several seconds of recording obscured by muscle artifact before development of rhythmic theta activity in the right temporal leads transitioning into diffuse muscle artifact consistent with generalization.    DAY 3:  Recording Times  Start on 2/6/2020 at 07:01:35 stop on 2/6/2020 at 08:53:21  A total of 1:51:46 hours of EEG/Video telemetry was recorded.  Events/Seizures recorded  None  Background activity:   The background rhythm was characterized by alpha and anterior dominant beta activity with a 10-11Hz posterior dominant alpha rhythm at 30-70 microvolts.   Symmetry and continuity: the background was continuous and symmetric  Sleep:   Normal sleep transients including sleep spindles, K complexes, vertex waves and POSTS were seen.  Activation procedures:   None  Abnormal activity:   There are occasional right anterior temporal sharp waves at T2, F8 in sleep.  CLINICAL DESCRIPTION OF EVENTS/SEIZURES:  None    FINAL SUMMARY  This abnormal two day video EEG recorded four of the patient's typical convulsions and one staring spell, all with EEG correlate consistent with focal onset seizures originating in the right temporal lobe.  Semiology in all seizures was consistent with temporal lobe onset and all convulsions lateralized to the right hemisphere.  Interictal activity indicated a seizure focus in the right anterior temporal lobe as well.      Prakash Brooks M.D.

## 2020-02-05 NOTE — NURSING
Pt had and event lasting 30 sec Was not in the room to witness the even. Pt bit the side of her tongue had evidence of vomiting or spitting on her bed padding. Could not answer at first where she was. She could give a thumbs up a min later. VS documented. WCTM

## 2020-02-05 NOTE — NURSING
At 328 am pt had an event. Last 2-3 mins. I witness the pt   she bit her tongue and  heart rate elevated. She was put side laying position suctioned and oxygen administered. After event pt was only oriented to self. She couldn't answer where she was the year or month. At 341 am  I asked if she know where she was she told me the hospital.  Couldn't say month or year.

## 2020-02-06 VITALS
RESPIRATION RATE: 16 BRPM | HEART RATE: 79 BPM | SYSTOLIC BLOOD PRESSURE: 101 MMHG | DIASTOLIC BLOOD PRESSURE: 61 MMHG | OXYGEN SATURATION: 95 % | TEMPERATURE: 98 F

## 2020-02-06 PROBLEM — R56.9 SEIZURES: Status: RESOLVED | Noted: 2020-01-11 | Resolved: 2020-02-06

## 2020-02-06 PROCEDURE — 25000003 PHARM REV CODE 250: Performed by: STUDENT IN AN ORGANIZED HEALTH CARE EDUCATION/TRAINING PROGRAM

## 2020-02-06 PROCEDURE — 63600175 PHARM REV CODE 636 W HCPCS: Performed by: STUDENT IN AN ORGANIZED HEALTH CARE EDUCATION/TRAINING PROGRAM

## 2020-02-06 PROCEDURE — S4991 NICOTINE PATCH NONLEGEND: HCPCS | Performed by: STUDENT IN AN ORGANIZED HEALTH CARE EDUCATION/TRAINING PROGRAM

## 2020-02-06 PROCEDURE — 95813 EEG EXTND MNTR 61-119 MIN: CPT | Mod: 26,,, | Performed by: PSYCHIATRY & NEUROLOGY

## 2020-02-06 PROCEDURE — 99239 HOSP IP/OBS DSCHRG MGMT >30: CPT | Mod: ,,, | Performed by: PSYCHIATRY & NEUROLOGY

## 2020-02-06 PROCEDURE — 99239 PR HOSPITAL DISCHARGE DAY,>30 MIN: ICD-10-PCS | Mod: ,,, | Performed by: PSYCHIATRY & NEUROLOGY

## 2020-02-06 PROCEDURE — 95813 PR EEG, EXTENDED, 61-119 MINS: ICD-10-PCS | Mod: 26,,, | Performed by: PSYCHIATRY & NEUROLOGY

## 2020-02-06 RX ORDER — LAMOTRIGINE 50 MG/1
50 TABLET, EXTENDED RELEASE ORAL DAILY
Qty: 14 TABLET | Refills: 0 | Status: SHIPPED | OUTPATIENT
Start: 2020-02-06 | End: 2021-02-05

## 2020-02-06 RX ORDER — LAMOTRIGINE 25 MG/1
25 TABLET ORAL DAILY
Qty: 14 TABLET | Refills: 0 | Status: SHIPPED | OUTPATIENT
Start: 2020-02-07 | End: 2021-02-06

## 2020-02-06 RX ORDER — LORAZEPAM 2 MG/ML
2 INJECTION INTRAMUSCULAR ONCE
Status: DISCONTINUED | OUTPATIENT
Start: 2020-02-06 | End: 2020-02-06

## 2020-02-06 RX ORDER — LAMOTRIGINE 100 MG/1
TABLET ORAL
Qty: 60 TABLET | Refills: 11 | Status: SHIPPED | OUTPATIENT
Start: 2020-02-06 | End: 2020-11-18 | Stop reason: SDUPTHER

## 2020-02-06 RX ORDER — LORAZEPAM 2 MG/ML
2 INJECTION INTRAMUSCULAR ONCE
Status: COMPLETED | OUTPATIENT
Start: 2020-02-06 | End: 2020-02-06

## 2020-02-06 RX ADMIN — LORAZEPAM 2 MG: 2 INJECTION INTRAMUSCULAR; INTRAVENOUS at 10:02

## 2020-02-06 RX ADMIN — LAMOTRIGINE 25 MG: 25 TABLET ORAL at 10:02

## 2020-02-06 RX ADMIN — LEVETIRACETAM 2000 MG: 750 TABLET ORAL at 10:02

## 2020-02-06 RX ADMIN — NICOTINE 1 PATCH: 14 PATCH TRANSDERMAL at 10:02
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
## 2020-02-06 NOTE — PLAN OF CARE
02/06/20 1301   Discharge Assessment   Assessment Type Discharge Planning Assessment   Confirmed/corrected address and phone number on facesheet? Yes   Assessment information obtained from? Patient   Expected Length of Stay (days) 2   Communicated expected length of stay with patient/caregiver yes   Prior to hospitilization cognitive status: Alert/Oriented   Prior to hospitalization functional status: Independent   Current cognitive status: Alert/Oriented   Current Functional Status: Independent   Lives With child(torri), dependent   Able to Return to Prior Arrangements yes   Is patient able to care for self after discharge? Yes   Who are your caregiver(s) and their phone number(s)? Madison Tao (mother) [phone]   Patient's perception of discharge disposition home or selfcare   Readmission Within the Last 30 Days no previous admission in last 30 days   Patient currently being followed by outpatient case management? No   Patient currently receives any other outside agency services? No   Equipment Currently Used at Home none   Do you have any problems affording any of your prescribed medications? No   Is the patient taking medications as prescribed? yes   Does the patient have transportation home? Yes   Transportation Anticipated family or friend will provide  (mom)   Does the patient receive services at the [facility] Clinic? No   Discharge Plan A Home   Discharge Plan B Home   Patient/Family in Agreement with Plan yes     CM met with patient in room for Discharge Planning Assessment.  Per patient,  patient lives with dependent child in a townhouse with 0 steps to enter.   Per patient, patient was independent with ADLS and used no DME for ambulation.  Per patient, the patient will have assistance from famiy upon discharge.   Discharge Planning Booklet given to patient/family and discussed.  All questions addressed.         PCP:  Primary Doctor No      Pharmacy:    [id number]  [place] [id number] [facility]  [address]  [place] [address]  Phone: [phone] Fax: [phone]        Emergency Contacts:  Extended Emergency Contact Information  Primary Emergency Contact: Madison Tao   [facility] Phone: [phone]  Relation: Mother      Insurance:    Payor: MEDICAID / Plan: [facility] [employer] [facility] ([place] MEDICAID) / Product Type: Managed Medicaid /       Kathya Molina RN  Case Management  Ext: [phone]  02/06/2020  1:03 PM

## 2020-02-06 NOTE — NURSING
"When trying to perform assessment, patient only allowed me to take vital signs. Patient continuously pulled blankets over her head when I told her I needed to asses her. Patient allowed me to flush IV; IV was bad and popped out. I told patient I will have to start another IV and explained it was necessary to have IV access to administer IV meds during an event. She stated "no, I'm tired." I returned to her room at 2200 to attempt to insert IV again, to which she refused again and then pulled her blankets over her head and rolled over. PCT informed me that she attempted to fix telemetry leads and the patient refused this as well. Patient was educated on why telemetry monitoring and IV are necessary and she continues to refuse.  WCTM.   "

## 2020-02-06 NOTE — PLAN OF CARE
Patient AAOx4. POC and meds reviewed with patient. V/S stable throughout shift; please see flowsheets for V/S information and full assessment data. Patient refusing care overnight. NAEO. Siderails up x 4 & padded, bed locked in lowest position, call bell in reach, O2 & suction at bedside, TM.

## 2020-02-06 NOTE — PLAN OF CARE
02/06/20 1304   Final Note   Assessment Type Final Discharge Note   Anticipated Discharge Disposition Home   Hospital Follow Up  Appt(s) scheduled? No   Discharge plans and expectations educations in teach back method with documentation complete? No   Right Care Referral Info   Post Acute Recommendation No Care   Post-Acute Status   Post-Acute Authorization Other   Other Status No Post-Acute Service Needs   Discharge Delays None known at this time     Patient medically ready for discharge to home.     No future appointments.    Kathya Molina RN  Case Management  Ext: [phone]  02/06/2020  1:04 PM

## 2020-02-06 NOTE — NURSING
RN witnessed pt actively seizure, turned pt to right side and suctioned pt. See v/s in flowsheet. Will continue to monitor.

## 2020-02-06 NOTE — NURSING
"Patient is a 31yo female admitted for seizure monitoring. Patient laying supine in bed HOB 20-30. Patient cooperative but flat. This nurse was able to do vitals but unable to complete full head to toe. Patient did not eat breakfast stating "I dont have an appetite." Patient verbalized readiness to be discharged home. Mom at bedside when drs made rounds and discussed discharged including new medications. This nurse entered room to discharge pt. Patient   "

## 2020-02-06 NOTE — DISCHARGE INSTRUCTIONS
Please call the Epilepsy Clinic for any questions or concerns.  You can call [phone] and tell them you are one of Dr. Monisha Post's patients and they will direct you to the nursing staff for her and the other doctors in the Ep[facility]psy [facility] at [facility].

## 2020-02-06 NOTE — PLAN OF CARE
Problem: Adult Inpatient Plan of Care  Goal: Plan of Care Review  Outcome: Ongoing, Progressing  Goal: Patient-Specific Goal (Individualization)  Outcome: Ongoing, Progressing  Goal: Absence of Hospital-Acquired Illness or Injury  Outcome: Ongoing, Progressing  Goal: Optimal Comfort and Wellbeing  Outcome: Ongoing, Progressing  Goal: Readiness for Transition of Care  Outcome: Ongoing, Progressing  Goal: Rounds/Family Conference  Outcome: Ongoing, Progressing    Neuro - aaox4  Deficits - none  V/S - see v/s flowsheet  Tele - NSR  Diet - regular  GI - none today  - voids  IV - x1  IV Fluids - none   Skin - intact  Others - pt had 2 active seizures witnessed, versed and Toradol given, pt more awake this evening, will continue to monitor

## 2020-02-06 NOTE — NURSING
Night and day RN witnessed pt having a seizure, pt turned to right side and suctioned. Seizure lasted 1 min.  See v/s in flowsheet. Will continue to monitor.

## 2020-02-06 NOTE — PROGRESS NOTES
[facility]  Epilepsy Monitoring Unit  Daily Progress Note    Date: 1/9/20  Patient Name: Ifrah Valentine   MRN: [id number]   PCP: Primary Doctor No    Subjective:     Interval History:  Patient has had a few more seizures yesterday evening, generalized seizure at 18:27.  Discussed staying until 24 hours seizure-free but patient is very eager to go and states understanding of risk.  Has been loaded with Keppra and started on lamotrigine.  Gave her a calendar with schedule for lamotrigine titration, discussed seizure precautions, gave her contact information for our clinic for any questions from her or family members that come up after discharge.      HOSPITAL COURSE:  02/04/20-02/05/20:  Seizure overnight ~ 03:26 AM 02/05/20.  Additional seizures after 7 am.  R temporal onset with generalization.  IV midazolam given, starting lamotrigine but will continue levetiracetam until up to appropriate dose on lamotrigine.  02/05/20-02/06/20:  Another generalized seizure 18:27 with some additional seizures early morning 2/6--5:50, 6:20.    PAST MEDICAL HISTORY:  Past Medical History:   Diagnosis Date    Idiopathic generalized epilepsy     Onset April 2019     PAST SURGICAL HISTORY:  History reviewed. No pertinent surgical history.  CURRENT MEDS:  Current Facility-Administered Medications   Medication Dose Route Frequency Provider Last Rate Last Dose    acetaminophen tablet 650 mg  650 mg Oral Q6H PRN Padmini Ch MD   650 mg at 02/05/20 0929    docusate sodium capsule 100 mg  100 mg Oral BID PRN Padmini Ch MD        HYDROcodone-acetaminophen 5-325 mg per tablet 1 tablet  1 tablet Oral Q6H PRN Padmini Ch MD        lamoTRIgine tablet 25 mg  25 mg Oral Daily Padmini Ch MD   25 mg at 02/06/20 1029    levETIRAcetam tablet 500 mg  500 mg Oral BID Padmini Ch MD   500 mg at 02/05/20 2051    lorazepam injection 2 mg  2 mg Intravenous Q15 Min PRN  Padmini Ch MD        nicotine 14 mg/24 hr 1 patch  1 patch Transdermal Daily Padmini Ch MD   1 patch at 02/06/20 1029    ondansetron tablet 4 mg  4 mg Oral Q6H PRN aPdmini Ch MD   4 mg at 02/05/20 1717    sodium chloride 0.9% flush 10 mL  10 mL Intravenous PRN Padmini Ch MD         Current Outpatient Medications   Medication Sig Dispense Refill    diphenhydrAMINE-aluminum-magnesium hydroxide-simethicone-lidocaine HCl 2% Swish and spit 10 mLs every 4 (four) hours as needed. 120 mL 0    lamoTRIgine (LAMICTAL) 100 MG tablet Take 100 mg daily for two weeks (03/05/20-03/18/20), then increase to 200 mg (two tablets) daily starting 3/19/20. 60 tablet 11    [START ON 2/7/2020] lamoTRIgine (LAMICTAL) 25 MG tablet Take 1 tablet (25 mg total) by mouth once daily for 2 weeks (02/06/2020-02/19/2020). 14 tablet 0    lamoTRIgine 50 mg TR24 Take 50 mg by mouth once daily for two weeks (02/20/20-03/04/20) 14 tablet 0    levETIRAcetam (KEPPRA) 500 MG Tab Take 500 mg by mouth.      magic mouthwash diphen/antac/lidoc Swish and spit 10mls by mouth every 4 hours as needed 120 mL 0     ALLERGIES:  Review of patient's allergies indicates:  No Known Allergies  FAMILY HISTORY:  History reviewed. No pertinent family history.  SOCIAL HISTORY:  Social History     Tobacco Use    Smoking status: Current Every Day Smoker     Packs/day: 0.50     Years: 10.00     Pack years: 5.00     Types: Cigarettes    Smokeless tobacco: Never Used   Substance Use Topics    Alcohol use: Yes     Frequency: 2-4 times a month     Drinks per session: 3 or 4     Binge frequency: Never    Drug use: Yes     Frequency: 3.0 times per week     Types: Marijuana     Review of Systems:  12 system review of systems is negative except as noted in HPI.      Objective:     Vitals:    02/05/20 2300 02/05/20 2349 02/06/20 0300 02/06/20 0441   BP:  104/64  101/61   BP Location:  Right arm     Patient  Position:  Lying     Pulse: 75 68 78 79   Resp:  15  16   Temp:  97.9 °F (36.6 °C)  97.5 °F (36.4 °C)   TempSrc:  Oral  Oral   SpO2:  97%  95%     General: NAD, well nourished   Eyes: no tearing, discharge, no erythema   ENT: moist mucous membranes of the oral cavity, no erythema or exudate; +tongue lac  Neck: Supple, Nml ROM without nuchal rigidity  Cardiovascular: Warm and well perfused, no LE edema  Lungs: Normal work of breathing, normal chest wall excursions  Skin: No rash, lesions, or breakdown on exposed skin  Psychiatry: Mood and affect are appropriate   Abdomen: soft, non distended  Extremeties: No cyanosis, clubbing or edema.    Neurological   MENTAL STATUS: More alert, awake this am and oriented x 3.  Asks appropriate questions, fund of knowledge and vocabulary wnl.  Speech intact.  Follows commands well.  CRANIAL NERVES: PERRL. EOMI. Facial movement intact, symmetric. Hearing grossly intact. Full shoulder shrug bilaterally. Tongue protrudes midline, palate raises symmetrically.  SENSORY: Sensation is intact to light touch throughout.   MOTOR: Normal bulk and tone. No pronator drift. Moves all extremities purposefully against gravity.  CEREBELLAR/COORDINATION/GAIT: Gait deferred 2/2 seizure risk.  No resting tremor or myoclonus.    Labs: Reviewed. Noted below.  Imaging: Reviewed imaging prior to admission per H&P.  No imaging on current admission.    Diagnostic EEG Results:  02/04/20-02/05/20 Date: EEG Background: Continuous, symmetric background with no clear interictal discharges.  Event 03:26 with R temporal onset and secondary generalization.     Activation Procedures: None    Seizures/Events: 02/05/20 03:26:14  Start Time: 02/05/20 03:26:14, Stop Time 03:28:04  Vitals:  Stable  Clinical Symptoms:  Patient sleeping, shaking in RUE with eyes open, head turn L w/ ictal cry and figure four posturing.  Unable to see BLE, reportedly extended.  Subjective Symptoms:  Pt does not remember seizure, has tongue  lac after    02/05/20-02/06/20 Date: EEG Background: Continuous, symmetric background with no clear interictal discharges.  Events last night, generalized and partial seizures.    Activation Procedures: None    Seizures/Events: 02/05/20 18:27, 2/6 5:50, 6:20  Vitals:  Stable  Clinical Symptoms:  Patient sleeping then has some shaking, eventually has head turn L with BUE extension  Subjective Symptoms:  Pt does not remember seizure    Assessment:      This is Ifrah Valentine, 30 y.o. female presenting for evaluation of seizure focus and AED adjustment.    Plan:      Problem List as of 2/5/2020 Reviewed: 1/11/2020  9:17 PM by Monisha Post MD       Unprioritized    Seizures    Complex partial epilepsy with generalization and with intractable epilepsy    Current Assessment & Plan 1/9/2020 Hospital Encounter Written 2/4/2020  4:12 PM by Padmini Ch MD     -Loaded with Keppra, starting her home dose 500 mg bid while titrating up on lamotrigine  -Lamotrigine 25 mg qd x 2 weeks, followed by 50 mg qd x 2 weeks, 100 mg qd x 2 weeks, then 200 mg qd thereafter.  Patient given calendar for titration schedule.   -Continue vEEG  -Continue seizure precautions  -IV lorazepam 2 mg prn generalized seizure > 5 minutes or multiple seizures w/o return to cognitive baseline within 30 minutes  -Follow up with Dr. Post

## 2020-02-10 ENCOUNTER — PATIENT OUTREACH (OUTPATIENT)
Dept: ADMINISTRATIVE | Facility: CLINIC | Age: 30
End: 2020-02-10

## 2020-02-10 NOTE — PATIENT INSTRUCTIONS
"Recurrent Seizure [Adult]  You have had another seizure today. A common cause of recurrent seizure is missing doses of the seizure medicine. However, sometimes seizures are difficult to control even when you take the medicine correctly. If this is the case for you, it may be necessary to increase your dosage or add or change to another medicine.  Home Care:  For This Seizure:  1. Since seizures are not predictable, you must avoid doing anything that might cause danger to you or others if you have another one. Therefore, until the seizures are under good control, take these precautions:  Do not drive a car, bicycle or motorcycle  Do not operate dangerous equipment such as power tools  Use a shower instead of a bath  Do not swim or climb (ladders, trees, roofs)  2. Tell your close friends and relatives about your seizure and teach them what to do for you if it happens again.  3. If you were prescribed a medicine to prevent seizures, take it exactly as directed. It does not work when taken on an "as needed" basis. Missing doses will increase the risk of having another seizure.  4. If you miss a dose, take the missed dose as soon as you remember. If it is almost time for your next dose, skip the missed dose. Restart the medicine at your next scheduled time. Do not take extra medicine to make up the missed dose.  5. Wear a "Medic-Alert" bracelet to advise emergency personnel of your condition.  For Future Seizures:  If You Are Alone:  If you feel a seizure coming on, the best thing to do is to lie down on a bed or on the floor. Lie on your side, not on your back. This will prevent falling, promote drainage of oral secretions out of the mouth and prevent choking. Be sure that you are clear of any objects that might injure you during the seizure. Call for help if there is time.  If Someone Is With You:  If someone is with you before the seizure, they should help you get in a safe position and call for help. They should not " try to force anything in your mouth once the seizure has begun. Doing this may cause injury.  Follow Up  with your doctor, or as directed by our staff.  NOTE: For the safety of yourself and others on the road, certain states require that the treating doctor inform the Public Health Department of any adult who is treated for a seizure and is at risk of further seizures. In this case, the Department of Motor Vehicles (DMV) will be notified and a restriction will be placed on your s license until a doctor gives you medical clearance to drive again. Contact your treating doctor to find out if your state requires the reporting of patients with a seizures condition.  Get Prompt Medical Attention  if any of the following occur:  Seizures occurring more often or becoming longer than usual  Seizure lasting over 5 minutes  No wake-up between seizures  Remaining confused for more than 30 minutes after a seizure  Injury during a seizure  Fever over 100.4ºF (38.0ºC)  Unusual irritability, drowsiness or confusion  Stiff or painful neck  Worsening headache  © 0825-6366 [employer] [facility] [place], [address] [place] [address], [place] [address]. All rights reserved. This information is not intended as a substitute for professional medical care. Always follow your healthcare professional's instructions.

## 2020-02-14 ENCOUNTER — TELEPHONE (OUTPATIENT)
Dept: NEUROLOGY | Facility: CLINIC | Age: 30
End: 2020-02-14

## 2020-02-14 NOTE — TELEPHONE ENCOUNTER
Script for seizure monitor watch was approved by Dr. Post pt will send to company for process    ----- Message from Rachele Roberts MA sent at 2/14/2020 11:55 AM CST -----  Contact: Madison ( mom ) @ [phone]      ----- Message -----  From: Marbin Hassan  Sent: 2/14/2020  11:47 AM CST  To: [facility] Staff    Caller requesting a return call regarding the prescription for the watch, pls call to discuss further

## 2020-03-03 ENCOUNTER — TELEPHONE (OUTPATIENT)
Dept: NEUROLOGY | Facility: CLINIC | Age: 30
End: 2020-03-03

## 2020-03-03 NOTE — TELEPHONE ENCOUNTER
Returned patients call. Mother stated she was not sure if she needed to continue the keppra. She stated she is on 150 mg of lamictal daily. Informed her that goal dose is 200 mg and she should stay on keppra until on 200 mg daily.

## 2020-11-18 RX ORDER — LAMOTRIGINE 100 MG/1
TABLET ORAL
Qty: 60 TABLET | Refills: 2 | Status: SHIPPED | OUTPATIENT
Start: 2020-11-18 | End: 2020-12-11 | Stop reason: SDUPTHER

## 2020-11-18 NOTE — TELEPHONE ENCOUNTER
----- Message from Peter Wesley RN sent at 11/18/2020  9:38 AM CST -----  Contact: Madison Tao (mother) @ [phone]    ----- Message -----  From: Marisolaustin García  Sent: 11/18/2020   8:38 AM CST  To: [facility] Staff    Calling to schedule a f/u appt but there is nothing available.  Pt had a seizure last night.  Calling to req a refill for lamoTRIgine (LAMICTAL) 100 MG tablet.     [id number] - [place] - [address] [facility] AT [facility] [address] [phone] (Phone)      [phone] (Fax)

## 2020-11-18 NOTE — TELEPHONE ENCOUNTER
----- Message from Howard Raygoza sent at 11/18/2020  2:12 PM CST -----  Regarding: call back asap about an Rx and an appt      The Pt states that she would like for Peter to call her back asap about an Rx that she will need.  She states that her head is hurting her so bad and that she had a really bad seizure last night.    Phone # [phone]

## 2020-11-18 NOTE — TELEPHONE ENCOUNTER
Spoke with patient, she c/o a bad headache after experiencing a seizure. She wanted to know if Dr Post would call in a pain reliever and I told her Dr Post only prescribes seizure meds so she was advised to go to the ED

## 2020-11-18 NOTE — TELEPHONE ENCOUNTER
----- Message from Peter Wesley RN sent at 11/18/2020  9:38 AM CST -----  Contact: Madison Tao (mother) @ [phone]    ----- Message -----  From: Marisolaustin aGrcía  Sent: 11/18/2020   8:38 AM CST  To: [facility] Staff    Calling to schedule a f/u appt but there is nothing available.  Pt had a seizure last night.  Calling to req a refill for lamoTRIgine (LAMICTAL) 100 MG tablet.     [id number] - [place], [place] - [address] [facility] AT [facility] [address] [phone] (Phone)      [phone] (Fax)

## 2020-12-11 RX ORDER — LAMOTRIGINE 100 MG/1
200 TABLET ORAL 2 TIMES DAILY
Qty: 60 TABLET | Refills: 1 | Status: SHIPPED | OUTPATIENT
Start: 2020-12-11 | End: 2021-02-03 | Stop reason: SDUPTHER

## 2020-12-11 NOTE — TELEPHONE ENCOUNTER
----- Message from Brittany Smart sent at 12/11/2020 12:47 PM CST -----  Pt states that she's about to be out of medication and she needs to schedule an appt. Please call @[phone]

## 2020-12-14 ENCOUNTER — TELEPHONE (OUTPATIENT)
Dept: NEUROLOGY | Facility: CLINIC | Age: 30
End: 2020-12-14

## 2020-12-15 NOTE — TELEPHONE ENCOUNTER
----- Message from Kami Anderson sent at 12/14/2020  2:54 PM CST -----  Regarding: speak with nurse  Contact: patient  [phone]   please call above patient said she has called numerous times and no one has returned her calls almost out of medication waiting on a call back from the nurse thanks.

## 2021-02-03 RX ORDER — LEVETIRACETAM 500 MG/1
500 TABLET ORAL 2 TIMES DAILY
Qty: 60 TABLET | Refills: 0 | Status: SHIPPED | OUTPATIENT
Start: 2021-02-03 | End: 2021-04-15

## 2021-02-03 RX ORDER — LAMOTRIGINE 100 MG/1
200 TABLET ORAL 2 TIMES DAILY
Qty: 60 TABLET | Refills: 0 | Status: SHIPPED | OUTPATIENT
Start: 2021-02-03 | End: 2021-03-08 | Stop reason: SDUPTHER

## 2021-03-08 ENCOUNTER — NURSE TRIAGE (OUTPATIENT)
Dept: ADMINISTRATIVE | Facility: CLINIC | Age: 31
End: 2021-03-08

## 2021-03-08 RX ORDER — LAMOTRIGINE 100 MG/1
TABLET ORAL
Qty: 10 TABLET | Refills: 0 | Status: SHIPPED | OUTPATIENT
Start: 2021-03-08 | End: 2021-03-10

## 2021-03-08 RX ORDER — LAMOTRIGINE 100 MG/1
200 TABLET ORAL 2 TIMES DAILY
Qty: 60 TABLET | Refills: 0 | Status: SHIPPED | OUTPATIENT
Start: 2021-03-08 | End: 2021-03-10 | Stop reason: SDUPTHER

## 2021-03-10 ENCOUNTER — OFFICE VISIT (OUTPATIENT)
Dept: NEUROLOGY | Facility: CLINIC | Age: 31
End: 2021-03-10
Payer: MEDICAID

## 2021-03-10 VITALS
WEIGHT: 147.94 LBS | BODY MASS INDEX: 27.93 KG/M2 | HEART RATE: 73 BPM | DIASTOLIC BLOOD PRESSURE: 78 MMHG | HEIGHT: 61 IN | SYSTOLIC BLOOD PRESSURE: 119 MMHG

## 2021-03-10 DIAGNOSIS — G40.209 COMPLEX PARTIAL SEIZURES EVOLVING TO GENERALIZED TONIC-CLONIC SEIZURES: Primary | ICD-10-CM

## 2021-03-10 DIAGNOSIS — G43.809 OTHER MIGRAINE WITHOUT STATUS MIGRAINOSUS, NOT INTRACTABLE: ICD-10-CM

## 2021-03-10 DIAGNOSIS — F32.A ANXIETY AND DEPRESSION: ICD-10-CM

## 2021-03-10 DIAGNOSIS — F41.9 ANXIETY AND DEPRESSION: ICD-10-CM

## 2021-03-10 PROCEDURE — 99213 OFFICE O/P EST LOW 20 MIN: CPT | Mod: PBBFAC | Performed by: PSYCHIATRY & NEUROLOGY

## 2021-03-10 PROCEDURE — 99214 OFFICE O/P EST MOD 30 MIN: CPT | Mod: S$PBB,,, | Performed by: PSYCHIATRY & NEUROLOGY

## 2021-03-10 PROCEDURE — 99999 PR PBB SHADOW E&M-EST. PATIENT-LVL III: ICD-10-PCS | Mod: PBBFAC,,, | Performed by: PSYCHIATRY & NEUROLOGY

## 2021-03-10 PROCEDURE — 99999 PR PBB SHADOW E&M-EST. PATIENT-LVL III: CPT | Mod: PBBFAC,,, | Performed by: PSYCHIATRY & NEUROLOGY

## 2021-03-10 PROCEDURE — 99214 PR OFFICE/OUTPT VISIT, EST, LEVL IV, 30-39 MIN: ICD-10-PCS | Mod: S$PBB,,, | Performed by: PSYCHIATRY & NEUROLOGY

## 2021-03-10 RX ORDER — LAMOTRIGINE 200 MG/1
200 TABLET ORAL DAILY
Qty: 30 TABLET | Refills: 5 | Status: SHIPPED | OUTPATIENT
Start: 2021-03-10 | End: 2021-08-04 | Stop reason: SDUPTHER

## 2021-03-14 PROBLEM — G43.909 MIGRAINE WITHOUT STATUS MIGRAINOSUS, NOT INTRACTABLE: Status: ACTIVE | Noted: 2021-03-14

## 2021-03-14 PROBLEM — G40.209 COMPLEX PARTIAL SEIZURES EVOLVING TO GENERALIZED TONIC-CLONIC SEIZURES: Status: ACTIVE | Noted: 2021-03-14

## 2021-04-08 ENCOUNTER — PATIENT MESSAGE (OUTPATIENT)
Dept: NEUROLOGY | Facility: CLINIC | Age: 31
End: 2021-04-08

## 2021-04-15 ENCOUNTER — OFFICE VISIT (OUTPATIENT)
Dept: PSYCHIATRY | Facility: CLINIC | Age: 31
End: 2021-04-15
Payer: MEDICAID

## 2021-04-15 ENCOUNTER — PATIENT MESSAGE (OUTPATIENT)
Dept: NEUROLOGY | Facility: CLINIC | Age: 31
End: 2021-04-15

## 2021-04-15 ENCOUNTER — PATIENT MESSAGE (OUTPATIENT)
Dept: PSYCHIATRY | Facility: CLINIC | Age: 31
End: 2021-04-15

## 2021-04-15 DIAGNOSIS — F32.A ANXIETY AND DEPRESSION: ICD-10-CM

## 2021-04-15 DIAGNOSIS — F43.23 ADJUSTMENT DISORDER WITH MIXED ANXIETY AND DEPRESSED MOOD: Primary | ICD-10-CM

## 2021-04-15 DIAGNOSIS — F41.9 ANXIETY AND DEPRESSION: ICD-10-CM

## 2021-04-15 PROCEDURE — 99205 PR OFFICE/OUTPT VISIT, NEW, LEVL V, 60-74 MIN: ICD-10-PCS | Mod: 95,SA,HB, | Performed by: NURSE PRACTITIONER

## 2021-04-15 PROCEDURE — 99205 OFFICE O/P NEW HI 60 MIN: CPT | Mod: 95,SA,HB, | Performed by: NURSE PRACTITIONER

## 2021-04-15 RX ORDER — FLUOXETINE HYDROCHLORIDE 20 MG/1
20 CAPSULE ORAL DAILY
Qty: 30 CAPSULE | Refills: 2 | Status: SHIPPED | OUTPATIENT
Start: 2021-04-15 | End: 2021-08-23

## 2021-04-16 ENCOUNTER — PATIENT MESSAGE (OUTPATIENT)
Dept: RESEARCH | Facility: HOSPITAL | Age: 31
End: 2021-04-16

## 2021-04-26 ENCOUNTER — HOSPITAL ENCOUNTER (OUTPATIENT)
Dept: NEUROLOGY | Facility: CLINIC | Age: 31
Discharge: HOME | End: 2021-04-26
Payer: MEDICAID

## 2021-04-26 PROCEDURE — 95723 EEG PHY/QHP>60<84 HR W/O VID: CPT | Mod: ,,, | Performed by: PSYCHIATRY & NEUROLOGY

## 2021-04-26 PROCEDURE — 95723 PR EEG, W/O VIDEO, CONT RECORD, CMPLT STDY, I&R, >60<84 HRS: ICD-10-PCS | Mod: ,,, | Performed by: PSYCHIATRY & NEUROLOGY

## 2021-04-29 ENCOUNTER — HOSPITAL ENCOUNTER (OUTPATIENT)
Dept: NEUROLOGY | Facility: CLINIC | Age: 31
Discharge: HOME | End: 2021-04-29
Payer: MEDICAID

## 2021-04-29 DIAGNOSIS — F32.A ANXIETY AND DEPRESSION: ICD-10-CM

## 2021-04-29 DIAGNOSIS — F41.9 ANXIETY AND DEPRESSION: ICD-10-CM

## 2021-04-29 DIAGNOSIS — G40.209 COMPLEX PARTIAL SEIZURES EVOLVING TO GENERALIZED TONIC-CLONIC SEIZURES: ICD-10-CM

## 2021-04-30 ENCOUNTER — TELEPHONE (OUTPATIENT)
Dept: NEUROLOGY | Facility: CLINIC | Age: 31
End: 2021-04-30

## 2021-06-17 ENCOUNTER — TELEPHONE (OUTPATIENT)
Dept: NEUROLOGY | Facility: CLINIC | Age: 31
End: 2021-06-17

## 2021-07-23 ENCOUNTER — TELEPHONE (OUTPATIENT)
Dept: NEUROLOGY | Facility: CLINIC | Age: 31
End: 2021-07-23

## 2021-08-04 DIAGNOSIS — G40.209 COMPLEX PARTIAL SEIZURES EVOLVING TO GENERALIZED TONIC-CLONIC SEIZURES: Primary | ICD-10-CM

## 2021-08-04 RX ORDER — LAMOTRIGINE 200 MG/1
200 TABLET ORAL DAILY
Qty: 90 TABLET | Refills: 3 | Status: SHIPPED | OUTPATIENT
Start: 2021-08-04 | End: 2021-08-24 | Stop reason: SDUPTHER

## 2021-08-13 ENCOUNTER — TELEPHONE (OUTPATIENT)
Dept: NEUROLOGY | Facility: CLINIC | Age: 31
End: 2021-08-13

## 2021-08-13 ENCOUNTER — NURSE TRIAGE (OUTPATIENT)
Dept: ADMINISTRATIVE | Facility: CLINIC | Age: 31
End: 2021-08-13

## 2021-08-14 ENCOUNTER — HOSPITAL ENCOUNTER (EMERGENCY)
Facility: HOSPITAL | Age: 31
Discharge: HOME | End: 2021-08-14
Attending: EMERGENCY MEDICINE
Payer: MEDICAID

## 2021-08-14 VITALS
OXYGEN SATURATION: 97 % | BODY MASS INDEX: 26.43 KG/M2 | SYSTOLIC BLOOD PRESSURE: 120 MMHG | DIASTOLIC BLOOD PRESSURE: 77 MMHG | HEART RATE: 92 BPM | HEIGHT: 61 IN | RESPIRATION RATE: 17 BRPM | TEMPERATURE: 99 F | WEIGHT: 140 LBS

## 2021-08-14 DIAGNOSIS — U07.1 COVID-19: ICD-10-CM

## 2021-08-14 DIAGNOSIS — R51.9 ACUTE NONINTRACTABLE HEADACHE, UNSPECIFIED HEADACHE TYPE: ICD-10-CM

## 2021-08-14 DIAGNOSIS — G40.909 SEIZURE DISORDER: Primary | ICD-10-CM

## 2021-08-14 LAB
ALBUMIN SERPL BCP-MCNC: 3.8 G/DL (ref 3.5–5.2)
ALP SERPL-CCNC: 58 U/L (ref 55–135)
ALT SERPL W/O P-5'-P-CCNC: 14 U/L (ref 10–44)
ANION GAP SERPL CALC-SCNC: 10 MMOL/L (ref 8–16)
AST SERPL-CCNC: 25 U/L (ref 10–40)
B-HCG UR QL: NEGATIVE
BASOPHILS # BLD AUTO: 0.02 K/UL (ref 0–0.2)
BASOPHILS NFR BLD: 0.4 % (ref 0–1.9)
BILIRUB SERPL-MCNC: 0.3 MG/DL (ref 0.1–1)
BUN SERPL-MCNC: 6 MG/DL (ref 6–20)
CALCIUM SERPL-MCNC: 9.1 MG/DL (ref 8.7–10.5)
CHLORIDE SERPL-SCNC: 104 MMOL/L (ref 95–110)
CO2 SERPL-SCNC: 22 MMOL/L (ref 23–29)
CREAT SERPL-MCNC: 0.8 MG/DL (ref 0.5–1.4)
CTP QC/QA: YES
CTP QC/QA: YES
DIFFERENTIAL METHOD: ABNORMAL
EOSINOPHIL # BLD AUTO: 0 K/UL (ref 0–0.5)
EOSINOPHIL NFR BLD: 0.2 % (ref 0–8)
ERYTHROCYTE [DISTWIDTH] IN BLOOD BY AUTOMATED COUNT: 12.8 % (ref 11.5–14.5)
EST. GFR  (AFRICAN AMERICAN): >60 ML/MIN/1.73 M^2
EST. GFR  (NON AFRICAN AMERICAN): >60 ML/MIN/1.73 M^2
GLUCOSE SERPL-MCNC: 90 MG/DL (ref 70–110)
HCT VFR BLD AUTO: 42 % (ref 37–48.5)
HGB BLD-MCNC: 14.6 G/DL (ref 12–16)
IMM GRANULOCYTES # BLD AUTO: 0.01 K/UL (ref 0–0.04)
IMM GRANULOCYTES NFR BLD AUTO: 0.2 % (ref 0–0.5)
LYMPHOCYTES # BLD AUTO: 3.6 K/UL (ref 1–4.8)
LYMPHOCYTES NFR BLD: 64.5 % (ref 18–48)
MCH RBC QN AUTO: 33 PG (ref 27–31)
MCHC RBC AUTO-ENTMCNC: 34.8 G/DL (ref 32–36)
MCV RBC AUTO: 95 FL (ref 82–98)
MONOCYTES # BLD AUTO: 0.4 K/UL (ref 0.3–1)
MONOCYTES NFR BLD: 6.9 % (ref 4–15)
NEUTROPHILS # BLD AUTO: 1.5 K/UL (ref 1.8–7.7)
NEUTROPHILS NFR BLD: 27.8 % (ref 38–73)
NRBC BLD-RTO: 0 /100 WBC
PLATELET # BLD AUTO: ABNORMAL K/UL (ref 150–450)
PMV BLD AUTO: ABNORMAL FL (ref 9.2–12.9)
POTASSIUM SERPL-SCNC: 4 MMOL/L (ref 3.5–5.1)
PROT SERPL-MCNC: 6.8 G/DL (ref 6–8.4)
RBC # BLD AUTO: 4.42 M/UL (ref 4–5.4)
SARS-COV-2 RDRP RESP QL NAA+PROBE: POSITIVE
SODIUM SERPL-SCNC: 136 MMOL/L (ref 136–145)
WBC # BLD AUTO: 5.52 K/UL (ref 3.9–12.7)

## 2021-08-14 PROCEDURE — 81025 URINE PREGNANCY TEST: CPT | Performed by: PHYSICIAN ASSISTANT

## 2021-08-14 PROCEDURE — 63600175 PHARM REV CODE 636 W HCPCS: Performed by: PHYSICIAN ASSISTANT

## 2021-08-14 PROCEDURE — U0002 COVID-19 LAB TEST NON-CDC: HCPCS | Performed by: PHYSICIAN ASSISTANT

## 2021-08-14 PROCEDURE — 99284 EMERGENCY DEPT VISIT MOD MDM: CPT | Mod: 25

## 2021-08-14 PROCEDURE — 80053 COMPREHEN METABOLIC PANEL: CPT | Performed by: PHYSICIAN ASSISTANT

## 2021-08-14 PROCEDURE — 80175 DRUG SCREEN QUAN LAMOTRIGINE: CPT | Performed by: PHYSICIAN ASSISTANT

## 2021-08-14 PROCEDURE — 85025 COMPLETE CBC W/AUTO DIFF WBC: CPT | Performed by: PHYSICIAN ASSISTANT

## 2021-08-14 PROCEDURE — 96372 THER/PROPH/DIAG INJ SC/IM: CPT

## 2021-08-14 RX ORDER — FLUTICASONE PROPIONATE 50 MCG
2 SPRAY, SUSPENSION (ML) NASAL DAILY PRN
Qty: 15 G | Refills: 0 | Status: SHIPPED | OUTPATIENT
Start: 2021-08-14

## 2021-08-14 RX ORDER — DEXAMETHASONE SODIUM PHOSPHATE 4 MG/ML
12 INJECTION, SOLUTION INTRA-ARTICULAR; INTRALESIONAL; INTRAMUSCULAR; INTRAVENOUS; SOFT TISSUE
Status: COMPLETED | OUTPATIENT
Start: 2021-08-14 | End: 2021-08-14

## 2021-08-14 RX ADMIN — DEXAMETHASONE SODIUM PHOSPHATE 12 MG: 4 INJECTION INTRA-ARTICULAR; INTRALESIONAL; INTRAMUSCULAR; INTRAVENOUS; SOFT TISSUE at 04:08

## 2021-08-18 LAB — LAMOTRIGINE SERPL-MCNC: 6.2 UG/ML (ref 2–15)

## 2021-08-23 ENCOUNTER — OFFICE VISIT (OUTPATIENT)
Dept: NEUROLOGY | Facility: CLINIC | Age: 31
End: 2021-08-23
Payer: MEDICAID

## 2021-08-23 DIAGNOSIS — G43.111 INTRACTABLE MIGRAINE WITH AURA WITH STATUS MIGRAINOSUS: ICD-10-CM

## 2021-08-23 DIAGNOSIS — F43.23 ADJUSTMENT DISORDER WITH MIXED ANXIETY AND DEPRESSED MOOD: ICD-10-CM

## 2021-08-23 DIAGNOSIS — G40.209 COMPLEX PARTIAL SEIZURES EVOLVING TO GENERALIZED TONIC-CLONIC SEIZURES: Primary | ICD-10-CM

## 2021-08-23 DIAGNOSIS — F32.A ANXIETY AND DEPRESSION: ICD-10-CM

## 2021-08-23 DIAGNOSIS — F41.9 ANXIETY AND DEPRESSION: ICD-10-CM

## 2021-08-23 PROCEDURE — 99214 PR OFFICE/OUTPT VISIT, EST, LEVL IV, 30-39 MIN: ICD-10-PCS | Mod: 95,,, | Performed by: PSYCHIATRY & NEUROLOGY

## 2021-08-23 PROCEDURE — 99214 OFFICE O/P EST MOD 30 MIN: CPT | Mod: 95,,, | Performed by: PSYCHIATRY & NEUROLOGY

## 2021-08-23 RX ORDER — ALUMINUM CHLORIDE 20 %
SOLUTION, NON-ORAL TOPICAL NIGHTLY
COMMUNITY
Start: 2021-08-17

## 2021-08-24 PROBLEM — G43.111 INTRACTABLE MIGRAINE WITH AURA WITH STATUS MIGRAINOSUS: Status: ACTIVE | Noted: 2021-08-24

## 2021-08-24 PROBLEM — G43.909 MIGRAINE WITHOUT STATUS MIGRAINOSUS, NOT INTRACTABLE: Status: RESOLVED | Noted: 2021-03-14 | Resolved: 2021-08-24

## 2021-08-24 RX ORDER — SUMATRIPTAN 50 MG/1
50 TABLET, FILM COATED ORAL DAILY PRN
Qty: 18 TABLET | Refills: 11 | Status: SHIPPED | OUTPATIENT
Start: 2021-08-24 | End: 2023-03-15

## 2021-08-24 RX ORDER — LAMOTRIGINE 200 MG/1
200 TABLET ORAL 2 TIMES DAILY
Qty: 180 TABLET | Refills: 3 | Status: SHIPPED | OUTPATIENT
Start: 2021-08-24 | End: 2021-12-01

## 2021-08-24 RX ORDER — FOLIC ACID 1 MG/1
1 TABLET ORAL DAILY
Qty: 90 TABLET | Refills: 3 | Status: SHIPPED | OUTPATIENT
Start: 2021-08-24 | End: 2022-03-16

## 2021-09-04 ENCOUNTER — PATIENT MESSAGE (OUTPATIENT)
Dept: NEUROLOGY | Facility: CLINIC | Age: 31
End: 2021-09-04

## 2021-11-03 ENCOUNTER — TELEPHONE (OUTPATIENT)
Dept: NEUROLOGY | Facility: CLINIC | Age: 31
End: 2021-11-03
Payer: MEDICAID

## 2021-11-05 ENCOUNTER — TELEPHONE (OUTPATIENT)
Dept: NEUROLOGY | Facility: CLINIC | Age: 31
End: 2021-11-05
Payer: MEDICAID

## 2021-12-01 ENCOUNTER — OFFICE VISIT (OUTPATIENT)
Dept: NEUROLOGY | Facility: CLINIC | Age: 31
End: 2021-12-01
Payer: MEDICAID

## 2021-12-01 VITALS
HEIGHT: 61 IN | DIASTOLIC BLOOD PRESSURE: 79 MMHG | HEART RATE: 86 BPM | SYSTOLIC BLOOD PRESSURE: 120 MMHG | BODY MASS INDEX: 26.45 KG/M2

## 2021-12-01 DIAGNOSIS — G40.219 LOCALIZATION-RELATED (FOCAL) (PARTIAL) SYMPTOMATIC EPILEPSY AND EPILEPTIC SYNDROMES WITH COMPLEX PARTIAL SEIZURES, INTRACTABLE, WITHOUT STATUS EPILEPTICUS: Primary | ICD-10-CM

## 2021-12-01 DIAGNOSIS — F32.A ANXIETY AND DEPRESSION: ICD-10-CM

## 2021-12-01 DIAGNOSIS — F41.9 ANXIETY AND DEPRESSION: ICD-10-CM

## 2021-12-01 DIAGNOSIS — G43.111 INTRACTABLE MIGRAINE WITH AURA WITH STATUS MIGRAINOSUS: ICD-10-CM

## 2021-12-01 PROCEDURE — 99215 OFFICE O/P EST HI 40 MIN: CPT | Mod: S$PBB,,, | Performed by: PSYCHIATRY & NEUROLOGY

## 2021-12-01 PROCEDURE — 99215 PR OFFICE/OUTPT VISIT, EST, LEVL V, 40-54 MIN: ICD-10-PCS | Mod: S$PBB,,, | Performed by: PSYCHIATRY & NEUROLOGY

## 2021-12-01 PROCEDURE — 99213 OFFICE O/P EST LOW 20 MIN: CPT | Mod: PBBFAC | Performed by: PSYCHIATRY & NEUROLOGY

## 2021-12-01 PROCEDURE — 99999 PR PBB SHADOW E&M-EST. PATIENT-LVL III: CPT | Mod: PBBFAC,,, | Performed by: PSYCHIATRY & NEUROLOGY

## 2021-12-01 PROCEDURE — 99999 PR PBB SHADOW E&M-EST. PATIENT-LVL III: ICD-10-PCS | Mod: PBBFAC,,, | Performed by: PSYCHIATRY & NEUROLOGY

## 2021-12-01 RX ORDER — SERTRALINE HYDROCHLORIDE 50 MG/1
50 TABLET, FILM COATED ORAL DAILY
Qty: 90 TABLET | Refills: 3 | Status: SHIPPED | OUTPATIENT
Start: 2021-12-01 | End: 2022-03-16

## 2021-12-01 RX ORDER — TRETINOIN 0.25 MG/G
CREAM TOPICAL NIGHTLY
COMMUNITY
Start: 2021-08-20

## 2021-12-01 RX ORDER — LAMOTRIGINE 300 MG/1
300 TABLET, EXTENDED RELEASE ORAL DAILY
Qty: 30 TABLET | Refills: 11 | Status: SHIPPED | OUTPATIENT
Start: 2021-12-01 | End: 2022-03-16 | Stop reason: SDUPTHER

## 2022-02-22 ENCOUNTER — PATIENT MESSAGE (OUTPATIENT)
Dept: RESEARCH | Facility: HOSPITAL | Age: 32
End: 2022-02-22
Payer: MEDICAID

## 2022-03-16 ENCOUNTER — OFFICE VISIT (OUTPATIENT)
Dept: NEUROLOGY | Facility: CLINIC | Age: 32
End: 2022-03-16
Payer: MEDICAID

## 2022-03-16 ENCOUNTER — PATIENT MESSAGE (OUTPATIENT)
Dept: NEUROLOGY | Facility: CLINIC | Age: 32
End: 2022-03-16

## 2022-03-16 VITALS
SYSTOLIC BLOOD PRESSURE: 106 MMHG | HEART RATE: 93 BPM | DIASTOLIC BLOOD PRESSURE: 76 MMHG | HEIGHT: 61 IN | WEIGHT: 147 LBS | BODY MASS INDEX: 27.75 KG/M2

## 2022-03-16 DIAGNOSIS — G40.219 LOCALIZATION-RELATED (FOCAL) (PARTIAL) SYMPTOMATIC EPILEPSY AND EPILEPTIC SYNDROMES WITH COMPLEX PARTIAL SEIZURES, INTRACTABLE, WITHOUT STATUS EPILEPTICUS: Primary | ICD-10-CM

## 2022-03-16 DIAGNOSIS — F41.9 ANXIETY AND DEPRESSION: ICD-10-CM

## 2022-03-16 DIAGNOSIS — F32.A ANXIETY AND DEPRESSION: ICD-10-CM

## 2022-03-16 DIAGNOSIS — G40.209 COMPLEX PARTIAL SEIZURES EVOLVING TO GENERALIZED TONIC-CLONIC SEIZURES: ICD-10-CM

## 2022-03-16 PROCEDURE — 3078F DIAST BP <80 MM HG: CPT | Mod: CPTII,,,

## 2022-03-16 PROCEDURE — 1159F PR MEDICATION LIST DOCUMENTED IN MEDICAL RECORD: ICD-10-PCS | Mod: CPTII,,,

## 2022-03-16 PROCEDURE — 3078F PR MOST RECENT DIASTOLIC BLOOD PRESSURE < 80 MM HG: ICD-10-PCS | Mod: CPTII,,,

## 2022-03-16 PROCEDURE — 99213 OFFICE O/P EST LOW 20 MIN: CPT | Mod: PBBFAC

## 2022-03-16 PROCEDURE — 1159F MED LIST DOCD IN RCRD: CPT | Mod: CPTII,,,

## 2022-03-16 PROCEDURE — 99999 PR PBB SHADOW E&M-EST. PATIENT-LVL III: CPT | Mod: PBBFAC,,,

## 2022-03-16 PROCEDURE — 99999 PR PBB SHADOW E&M-EST. PATIENT-LVL III: ICD-10-PCS | Mod: PBBFAC,,,

## 2022-03-16 PROCEDURE — 99215 OFFICE O/P EST HI 40 MIN: CPT | Mod: S$PBB,,,

## 2022-03-16 PROCEDURE — 3074F PR MOST RECENT SYSTOLIC BLOOD PRESSURE < 130 MM HG: ICD-10-PCS | Mod: CPTII,,,

## 2022-03-16 PROCEDURE — 3008F PR BODY MASS INDEX (BMI) DOCUMENTED: ICD-10-PCS | Mod: CPTII,,,

## 2022-03-16 PROCEDURE — 3074F SYST BP LT 130 MM HG: CPT | Mod: CPTII,,,

## 2022-03-16 PROCEDURE — 3008F BODY MASS INDEX DOCD: CPT | Mod: CPTII,,,

## 2022-03-16 PROCEDURE — 99215 PR OFFICE/OUTPT VISIT, EST, LEVL V, 40-54 MIN: ICD-10-PCS | Mod: S$PBB,,,

## 2022-03-16 RX ORDER — LAMOTRIGINE 300 MG/1
300 TABLET, EXTENDED RELEASE ORAL DAILY
Qty: 30 TABLET | Refills: 11 | Status: SHIPPED | OUTPATIENT
Start: 2022-03-16 | End: 2022-10-11 | Stop reason: SDUPTHER

## 2022-03-16 RX ORDER — SERTRALINE HYDROCHLORIDE 50 MG/1
50 TABLET, FILM COATED ORAL DAILY
Qty: 90 TABLET | Refills: 3 | Status: SHIPPED | OUTPATIENT
Start: 2022-03-16 | End: 2022-10-11

## 2022-03-16 RX ORDER — FOLIC ACID 1 MG/1
1 TABLET ORAL DAILY
Qty: 90 TABLET | Refills: 3 | Status: SHIPPED | OUTPATIENT
Start: 2022-03-16 | End: 2022-10-17
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
## 2022-03-16 NOTE — PATIENT INSTRUCTIONS
You came to Epilepsy Clinic because of your seizure disorder, headaches, and mood issues.  Your seizures are partially controlled on lamotrigine 300mg daily. If you have another seizure, start taking lamotrigine 300mg in the morning and at night.     Do not miss any doses of this medication. If you do miss a dose, take it as soon as you remember even if that means doubling up on the dose. Please get levels on your way out. Take folic acid 1mg daily. Please get regular sleep. Go to sleep at the same time and wake up at the same time every day. People with epilepsy require more sleep than people without epilepsy.  Sleeping 10-12 hours a day can be normal for a person with epilepsy.  Give yourself the time you need to get enough sleep.      I suspect your vision issues are likely due to migraine headaches. Sometimes migraines can cause vision changes without causing any associated pain. Please write a headache diary with special attention to headache symptoms, durations, triggers, medications used and bring this to the next clinic visit. For pain, you can take over-the-counter ibuprofen (max dose 400-600 mg every 8 hr)/naproxen (max dose 500 mg every 12 hr) or an acetaminophen formulation like Excedrin, Tylenol, or Goody's powder (max dose 1 g every 8 hr). You can also use Sumatriptan. Please take one tablet right at aura/headache onset. You may take a second tablet after an hour if the pain persists. It is important that you do not take any of these medications more than one full day every 4 days (7 days a month).  If you take them more frequently than that, it can contribute to a medication overuse headache as well as other issues such as a GI upset and/or kidney/liver injury.            Your mood is low. I would like you to try a new medication called sertraline. Please take 1/2 tablet for 14 days then increase to 1 tablet daily. You can take this medication with your lamotrigine and folic acid at night. You can  take it with or without food. You will have some side effects as you start this medication like GI upset, changes in appetite, changes in sleep, possibly more anxiety. Please push through, your body will adjust. It can take 4-6 weeks to notice an effect. You must be taking it every day for the medication to work properly. Please do a self inventory in two months to see how you are doing with the medication and give me an update through the portal at that time.     I have also put in a referral for you to speak with our , Kash. He can help with processing things that are going on and possibly assist in figuring out next steps in pursuing disability.      Per [place] law, no episodes of loss of consciousness for 6 months before you may drive.  Please avoid dangerous situations.  For example, no baths/pools by yourself, no heights, no power tools.  Please wear a bike helmet.  If you have a single breakthrough seizure, please patient portal me or call the office and we will decide the next steps. If you have multiple seizures in a row and do not return back to baseline, 911 needs to be called.      We will schedule a virtual follow up visit in 4 weeks or sooner with issues. Please patient portal with any questions or concerns.     Lissy Box PA-C   Neurology-Epilepsy  [facility]

## 2022-03-22 ENCOUNTER — OFFICE VISIT (OUTPATIENT)
Dept: URGENT CARE | Facility: CLINIC | Age: 32
End: 2022-03-22
Payer: MEDICAID

## 2022-03-22 VITALS
HEIGHT: 61 IN | RESPIRATION RATE: 16 BRPM | HEART RATE: 110 BPM | WEIGHT: 147 LBS | DIASTOLIC BLOOD PRESSURE: 76 MMHG | TEMPERATURE: 98 F | OXYGEN SATURATION: 98 % | SYSTOLIC BLOOD PRESSURE: 110 MMHG | BODY MASS INDEX: 27.75 KG/M2

## 2022-03-22 DIAGNOSIS — H10.9 CONJUNCTIVITIS OF LEFT EYE, UNSPECIFIED CONJUNCTIVITIS TYPE: Primary | ICD-10-CM

## 2022-03-22 PROCEDURE — 1159F PR MEDICATION LIST DOCUMENTED IN MEDICAL RECORD: ICD-10-PCS | Mod: CPTII,S$GLB,, | Performed by: PHYSICIAN ASSISTANT

## 2022-03-22 PROCEDURE — 1160F RVW MEDS BY RX/DR IN RCRD: CPT | Mod: CPTII,S$GLB,, | Performed by: PHYSICIAN ASSISTANT

## 2022-03-22 PROCEDURE — 3008F BODY MASS INDEX DOCD: CPT | Mod: CPTII,S$GLB,, | Performed by: PHYSICIAN ASSISTANT

## 2022-03-22 PROCEDURE — 1159F MED LIST DOCD IN RCRD: CPT | Mod: CPTII,S$GLB,, | Performed by: PHYSICIAN ASSISTANT

## 2022-03-22 PROCEDURE — 3078F PR MOST RECENT DIASTOLIC BLOOD PRESSURE < 80 MM HG: ICD-10-PCS | Mod: CPTII,S$GLB,, | Performed by: PHYSICIAN ASSISTANT

## 2022-03-22 PROCEDURE — 99203 PR OFFICE/OUTPT VISIT, NEW, LEVL III, 30-44 MIN: ICD-10-PCS | Mod: S$GLB,,, | Performed by: PHYSICIAN ASSISTANT

## 2022-03-22 PROCEDURE — 1160F PR REVIEW ALL MEDS BY PRESCRIBER/CLIN PHARMACIST DOCUMENTED: ICD-10-PCS | Mod: CPTII,S$GLB,, | Performed by: PHYSICIAN ASSISTANT

## 2022-03-22 PROCEDURE — 3078F DIAST BP <80 MM HG: CPT | Mod: CPTII,S$GLB,, | Performed by: PHYSICIAN ASSISTANT

## 2022-03-22 PROCEDURE — 3074F PR MOST RECENT SYSTOLIC BLOOD PRESSURE < 130 MM HG: ICD-10-PCS | Mod: CPTII,S$GLB,, | Performed by: PHYSICIAN ASSISTANT

## 2022-03-22 PROCEDURE — 3008F PR BODY MASS INDEX (BMI) DOCUMENTED: ICD-10-PCS | Mod: CPTII,S$GLB,, | Performed by: PHYSICIAN ASSISTANT

## 2022-03-22 PROCEDURE — 3074F SYST BP LT 130 MM HG: CPT | Mod: CPTII,S$GLB,, | Performed by: PHYSICIAN ASSISTANT

## 2022-03-22 PROCEDURE — 99203 OFFICE O/P NEW LOW 30 MIN: CPT | Mod: S$GLB,,, | Performed by: PHYSICIAN ASSISTANT

## 2022-03-22 RX ORDER — ERYTHROMYCIN 5 MG/G
OINTMENT OPHTHALMIC
Qty: 3.5 G | Refills: 0 | Status: SHIPPED | OUTPATIENT
Start: 2022-03-22 | End: 2023-10-26

## 2022-03-22 NOTE — PATIENT INSTRUCTIONS
- Rest.  - Drink plenty of fluids.  - Take Tylenol and/or Ibuprofen as directed as needed for fever/pain.  Do not take more than the recommended dose.  - follow up with your PCP within the next 1-2 weeks as needed.   - Take over-the-counter claritin, zyrtec, allegra, or xyzal as directed.  You should NOT use a decongestant form (D) of this medication if you have a history of hypertension or heart disease.   - You must understand that you have received an Urgent Care treatment only and that you may be released before all of your medical problems are known or treated.   - You, the patient, will arrange for follow up care as instructed.   - If your condition worsens or fails to improve we recommend that you receive another evaluation at the ER immediately or contact your PCP to discuss your concerns.   - You can call [phone] or [phone] to help schedule an appointment with the appropriate provider.

## 2022-03-22 NOTE — PROGRESS NOTES
"Subjective:       Patient ID: Ifrah Valentine is a 32 y.o. female.    Vitals:  height is 5' 1" (1.549 m) and weight is 66.7 kg (147 lb). Her temperature is 97.9 °F (36.6 °C). Her blood pressure is 110/76 and her pulse is 110. Her respiration is 16 and oxygen saturation is 98%.     Chief Complaint: Eye Problem    Pt stated that she has been having left eye irritation x2 weeks . Pt stated that she has has been using Visine eye drops.  Patient states that some mornings she is waking up without eye crusted shut and she does have some drainage throughout the day.  She says eye itself isn't significantly red.  She denies trauma.  Pt stated that she does not wear glasses or contacts , Pts pharmacy has been updated .     Eye Problem   The left eye is affected. This is a new problem. The current episode started 1 to 4 weeks ago. The problem occurs constantly. The problem has been unchanged. There was no injury mechanism. The pain is at a severity of 4/10. The pain is moderate. There is no known exposure to pink eye. She does not wear contacts. Associated symptoms include an eye discharge, eye redness, a foreign body sensation and itching. Pertinent negatives include no blurred vision, double vision, fever, nausea, photophobia, recent URI or vomiting. She has tried eye drops and water for the symptoms. The treatment provided no relief.       Constitution: Negative for fever.   Cardiovascular: Negative for chest pain.   Eyes: Positive for eye discharge, eye itching and eye redness. Negative for photophobia, double vision and blurred vision.   Respiratory: Negative for shortness of breath.    Gastrointestinal: Negative for nausea and vomiting.   Musculoskeletal: Negative for trauma.   Skin: Negative for rash.   Neurological: Negative for headaches.       Objective:      Physical Exam   Constitutional: She is oriented to person, place, and time. She appears well-developed. No distress.   HENT:   Head: Normocephalic and atraumatic. "   Eyes: Pupils are equal, round, and reactive to light. Lids are everted and swept, no foreign bodies found. Left eye exhibits discharge. No foreign body present in the left eye. Left conjunctiva is injected (minimal).   Slit lamp exam:       The left eye shows no corneal abrasion and no fluorescein uptake.        Comments:   Visual Acuity in Right Eye - Without correction: 20 25  With correction:   Visual Acuity in Left Eye - Without correction: 20 30  With correction:   Visual Acuity in Both Eyes - Without correction: 20 25  With correction:        Cardiovascular: Normal rate, regular rhythm and normal heart sounds.   No murmur heard.Exam reveals no gallop and no friction rub.   Pulmonary/Chest: Effort normal and breath sounds normal. No respiratory distress. She has no wheezes.   Musculoskeletal: Normal range of motion.         General: No tenderness. Normal range of motion.   Neurological: She is alert and oriented to person, place, and time.   Skin: Skin is warm, dry and not diaphoretic.   Psychiatric: Her behavior is normal. Judgment and thought content normal.   Nursing note and vitals reviewed.        Assessment:       1. Conjunctivitis of left eye, unspecified conjunctivitis type            Plan:         Conjunctivitis of left eye, unspecified conjunctivitis type  -     erythromycin (ROMYCIN) ophthalmic ointment; Apply 1/2 inch ribbon to the inner lower eyelid and then gently massage onto the eye with the eye closed 3 times a day for 1 week.  Dispense: 3.5 g; Refill: 0                   Patient Instructions   - Rest.  - Drink plenty of fluids.  - Take Tylenol and/or Ibuprofen as directed as needed for fever/pain.  Do not take more than the recommended dose.  - follow up with your PCP within the next 1-2 weeks as needed.   - Take over-the-counter claritin, zyrtec, allegra, or xyzal as directed.  You should NOT use a decongestant form (D) of this medication if you have a history of hypertension or heart  disease.   - You must understand that you have received an Urgent Care treatment only and that you may be released before all of your medical problems are known or treated.   - You, the patient, will arrange for follow up care as instructed.   - If your condition worsens or fails to improve we recommend that you receive another evaluation at the ER immediately or contact your PCP to discuss your concerns.   - You can call [phone] or [phone] to help schedule an appointment with the appropriate provider.

## 2022-03-25 ENCOUNTER — TELEPHONE (OUTPATIENT)
Dept: NEUROLOGY | Facility: CLINIC | Age: 32
End: 2022-03-25
Payer: MEDICAID

## 2022-03-25 NOTE — TELEPHONE ENCOUNTER
Epilepsy SW placed phone call to patient at [phone] and verbally spoke to her.     SW introduced himself and reported that he provides talk therapy and case management supports to patient's in the epilepsy department.     Patient declines any additional support at this time.     Patient reports she did not get her blood work completed last time but she will follow up.

## 2022-09-01 ENCOUNTER — TELEPHONE (OUTPATIENT)
Dept: NEUROLOGY | Facility: CLINIC | Age: 32
End: 2022-09-01
Payer: MEDICAID

## 2022-09-01 NOTE — TELEPHONE ENCOUNTER
----- Message from Kim Gonzalez MD PhD sent at 9/1/2022 11:16 AM CDT -----  Regarding: FW: Appt  Contact: pt @ [phone]  Follow up with Catalina please.    Thank you,  Kim   ----- Message -----  From: Erin Gutierrez  Sent: 9/1/2022   9:33 AM CDT  To: Lisa Alvarez Staff  Subject: Appt                                             Pt is calling to get appt, pt is having memory loss. Asking for a call back

## 2022-09-10 ENCOUNTER — NURSE TRIAGE (OUTPATIENT)
Dept: ADMINISTRATIVE | Facility: CLINIC | Age: 32
End: 2022-09-10
Payer: MEDICAID

## 2022-09-10 NOTE — TELEPHONE ENCOUNTER
Pt states she lost seizure medication, Lamictal XR, and she is concerned she will have seizures without it. She states the pharmacy staff instructed her to call her doctor or insurance for further assistance. Verified with pharmacy that they are able to refill the prescription if needed; the insurance just will not pay until later in the month. Instructed the patient to call her insurance company for further assistance.     Reason for Disposition   [1] Prescription prescribed recently is not at pharmacy AND [2] triager has access to patient's EMR AND [3] prescription is recorded in the EMR    Protocols used: Medication Refill and Renewal Call-A-

## 2022-09-25 ENCOUNTER — HOSPITAL ENCOUNTER (EMERGENCY)
Facility: HOSPITAL | Age: 32
Discharge: LEFT BEFORE DISCHARGE | End: 2022-09-25
Attending: EMERGENCY MEDICINE
Payer: MEDICAID

## 2022-09-25 VITALS
RESPIRATION RATE: 12 BRPM | DIASTOLIC BLOOD PRESSURE: 81 MMHG | SYSTOLIC BLOOD PRESSURE: 145 MMHG | TEMPERATURE: 98 F | HEIGHT: 61 IN | WEIGHT: 140 LBS | BODY MASS INDEX: 26.43 KG/M2 | OXYGEN SATURATION: 100 % | HEART RATE: 65 BPM

## 2022-09-25 DIAGNOSIS — G40.909 RECURRENT SEIZURES: Primary | ICD-10-CM

## 2022-09-25 LAB
ALBUMIN SERPL BCP-MCNC: 4.2 G/DL (ref 3.5–5.2)
ALP SERPL-CCNC: 62 U/L (ref 55–135)
ALT SERPL W/O P-5'-P-CCNC: 8 U/L (ref 10–44)
ANION GAP SERPL CALC-SCNC: 8 MMOL/L (ref 8–16)
AST SERPL-CCNC: 18 U/L (ref 10–40)
B-HCG UR QL: NEGATIVE
BASOPHILS # BLD AUTO: 0.03 K/UL (ref 0–0.2)
BASOPHILS NFR BLD: 0.3 % (ref 0–1.9)
BILIRUB SERPL-MCNC: 0.6 MG/DL (ref 0.1–1)
BUN SERPL-MCNC: 5 MG/DL (ref 6–20)
CALCIUM SERPL-MCNC: 9.1 MG/DL (ref 8.7–10.5)
CHLORIDE SERPL-SCNC: 108 MMOL/L (ref 95–110)
CK SERPL-CCNC: 77 U/L (ref 20–180)
CO2 SERPL-SCNC: 24 MMOL/L (ref 23–29)
CREAT SERPL-MCNC: 0.8 MG/DL (ref 0.5–1.4)
CTP QC/QA: YES
DIFFERENTIAL METHOD: ABNORMAL
EOSINOPHIL # BLD AUTO: 0.1 K/UL (ref 0–0.5)
EOSINOPHIL NFR BLD: 0.5 % (ref 0–8)
ERYTHROCYTE [DISTWIDTH] IN BLOOD BY AUTOMATED COUNT: 12.6 % (ref 11.5–14.5)
EST. GFR  (NO RACE VARIABLE): >60 ML/MIN/1.73 M^2
GLUCOSE SERPL-MCNC: 87 MG/DL (ref 70–110)
HCT VFR BLD AUTO: 42.8 % (ref 37–48.5)
HGB BLD-MCNC: 14.6 G/DL (ref 12–16)
IMM GRANULOCYTES # BLD AUTO: 0.02 K/UL (ref 0–0.04)
IMM GRANULOCYTES NFR BLD AUTO: 0.2 % (ref 0–0.5)
LYMPHOCYTES # BLD AUTO: 2.1 K/UL (ref 1–4.8)
LYMPHOCYTES NFR BLD: 19.2 % (ref 18–48)
MCH RBC QN AUTO: 33.6 PG (ref 27–31)
MCHC RBC AUTO-ENTMCNC: 34.1 G/DL (ref 32–36)
MCV RBC AUTO: 99 FL (ref 82–98)
MONOCYTES # BLD AUTO: 0.6 K/UL (ref 0.3–1)
MONOCYTES NFR BLD: 5.2 % (ref 4–15)
NEUTROPHILS # BLD AUTO: 8 K/UL (ref 1.8–7.7)
NEUTROPHILS NFR BLD: 74.6 % (ref 38–73)
NRBC BLD-RTO: 0 /100 WBC
PLATELET # BLD AUTO: 362 K/UL (ref 150–450)
PMV BLD AUTO: 9 FL (ref 9.2–12.9)
POTASSIUM SERPL-SCNC: 4 MMOL/L (ref 3.5–5.1)
PROT SERPL-MCNC: 6.7 G/DL (ref 6–8.4)
RBC # BLD AUTO: 4.34 M/UL (ref 4–5.4)
SODIUM SERPL-SCNC: 140 MMOL/L (ref 136–145)
WBC # BLD AUTO: 10.72 K/UL (ref 3.9–12.7)

## 2022-09-25 PROCEDURE — 99284 EMERGENCY DEPT VISIT MOD MDM: CPT | Mod: 25

## 2022-09-25 PROCEDURE — 96374 THER/PROPH/DIAG INJ IV PUSH: CPT

## 2022-09-25 PROCEDURE — 80053 COMPREHEN METABOLIC PANEL: CPT | Performed by: EMERGENCY MEDICINE

## 2022-09-25 PROCEDURE — 99284 EMERGENCY DEPT VISIT MOD MDM: CPT | Mod: ,,, | Performed by: EMERGENCY MEDICINE

## 2022-09-25 PROCEDURE — 82550 ASSAY OF CK (CPK): CPT | Performed by: EMERGENCY MEDICINE

## 2022-09-25 PROCEDURE — 96375 TX/PRO/DX INJ NEW DRUG ADDON: CPT

## 2022-09-25 PROCEDURE — 80175 DRUG SCREEN QUAN LAMOTRIGINE: CPT | Performed by: EMERGENCY MEDICINE

## 2022-09-25 PROCEDURE — 99284 PR EMERGENCY DEPT VISIT,LEVEL IV: ICD-10-PCS | Mod: ,,, | Performed by: EMERGENCY MEDICINE

## 2022-09-25 PROCEDURE — 25000003 PHARM REV CODE 250: Performed by: EMERGENCY MEDICINE

## 2022-09-25 PROCEDURE — 81025 URINE PREGNANCY TEST: CPT | Performed by: EMERGENCY MEDICINE

## 2022-09-25 PROCEDURE — 85025 COMPLETE CBC W/AUTO DIFF WBC: CPT | Performed by: EMERGENCY MEDICINE

## 2022-09-25 PROCEDURE — 63600175 PHARM REV CODE 636 W HCPCS: Performed by: EMERGENCY MEDICINE

## 2022-09-25 RX ORDER — DIPHENHYDRAMINE HYDROCHLORIDE 50 MG/ML
25 INJECTION INTRAMUSCULAR; INTRAVENOUS
Status: COMPLETED | OUTPATIENT
Start: 2022-09-25 | End: 2022-09-25

## 2022-09-25 RX ORDER — KETOROLAC TROMETHAMINE 30 MG/ML
10 INJECTION, SOLUTION INTRAMUSCULAR; INTRAVENOUS
Status: COMPLETED | OUTPATIENT
Start: 2022-09-25 | End: 2022-09-25

## 2022-09-25 RX ORDER — PROCHLORPERAZINE MALEATE 5 MG
10 TABLET ORAL
Status: COMPLETED | OUTPATIENT
Start: 2022-09-25 | End: 2022-09-25

## 2022-09-25 RX ADMIN — KETOROLAC TROMETHAMINE 10 MG: 30 INJECTION, SOLUTION INTRAMUSCULAR; INTRAVENOUS at 10:09

## 2022-09-25 RX ADMIN — DIPHENHYDRAMINE HYDROCHLORIDE 25 MG: 50 INJECTION, SOLUTION INTRAMUSCULAR; INTRAVENOUS at 10:09

## 2022-09-25 RX ADMIN — PROCHLORPERAZINE MALEATE 10 MG: 5 TABLET ORAL at 10:09

## 2022-09-25 RX ADMIN — SODIUM CHLORIDE 1000 ML: 0.9 INJECTION, SOLUTION INTRAVENOUS at 09:09

## 2022-09-26 ENCOUNTER — TELEPHONE (OUTPATIENT)
Dept: NEUROLOGY | Facility: CLINIC | Age: 32
End: 2022-09-26
Payer: MEDICAID

## 2022-09-26 NOTE — TELEPHONE ENCOUNTER
----- Message from Lissy Box PA-C sent at 9/26/2022  3:02 PM CDT -----  Contact: pt  You can schedule her for follow up with me and/or Dr. Gonzalez whenever is next available. Thanks!    Catalina  ----- Message -----  From: Ibis Wilbur  Sent: 9/26/2022   2:48 PM CDT  To: Isauro Yuan Staff    Pt is requesting immediate call back re: Tried to reach out to office, no answer. Pt said she was admitted to the emergency room because her legs gave out and every time she stands up she gets dizzy. Pt wants to speak to someone as soon as possible to see next steps on what she should do.     Confirmed contact below:  Contact Name:Ifrah Valentine  Phone Number: [phone]

## 2022-09-26 NOTE — ED NOTES
Patient identifiers for Ifrah Valentine 32 y.o. female checked and correct.  Chief Complaint   Patient presents with    Seizures     Hx epilepsy, states thinks she had seizure around 4 pm, patient states she does not remember what she was doing before or after seizure. States whole body hurts like after her usual seizures and had episode of urinary incontinence. Pt concerned bc she can't walk, bilateral leg weakness     Past Medical History:   Diagnosis Date    Idiopathic generalized epilepsy     Onset April 2019     Allergies reported: Review of patient's allergies indicates:  No Known Allergies      LOC: Patient is awake, alert, and aware of environment with an appropriate affect. Patient is oriented x 4 and speaking appropriately.  APPEARANCE: Patient resting comfortably and in no acute distress. Patient is clean and well groomed, patient's clothing is properly fastened.  HEENT: - JVD, + midline trach  SKIN: The skin is warm and dry. Patient has normal skin turgor and moist mucus membranes.   MUSKULOSKELETAL: Patient is moving all extremities well, no obvious deformities noted. Pulses intact. PMS x 4. Pt endorses L weakness and numbness and tingling.  RESPIRATORY: Airway is open and patent. Respirations are spontaneous and non-labored with normal effort and rate.  CARDIAC: Patient has a normal rate and rhythm. 65 on cardiac monitor. No peripheral edema noted. Pt endorses a HA.  ABDOMEN: No distention noted. Soft and non-tender upon palpation.  NEUROLOGICAL: pupils 4 mm, PERRL. Facial expression is symmetrical. Hands and legs are weak on L side.

## 2022-09-26 NOTE — ED NOTES
"Pt growing increasingly agitated. MD/resident notified. States she is " tired of waiting". Self removed PIV. This RN asked pt if she would be willing to wait for MD teaching prior to leaving- pt eloped at this time. Pt has family/friend, is AOx4 and ambulatory towards exit without difficulty. Bleeding controlled at this time.   "

## 2022-09-26 NOTE — PROVIDER PROGRESS NOTES - EMERGENCY DEPT.
Encounter Date: 9/25/2022    ED Physician Progress Notes        Physician Note:   Signed out to me.  Here with breakthrough seizure, reports compliance to her anti seizure medications, seizure happen in her sleep but she had secondary signs of it.  Back to baseline.  Labs are benign.  Pending urine.  Will reassess.  If she is well-appearing no new seizures, back continues to be back at baseline anticipate discharge home with outpatient follow-up and return precautions.      Update:  Was informed by the nurse that the patient eloped.  She was frustrated by the wait time and pulled her IV out and left prior to me being able to discuss her case with her or sign out AMA.  Vitals and labs were benign, she would not give urine.  She was back to her baseline status and there is no indication to keep patient against her will.  Patient is free to return to the she presents for evaluation.

## 2022-09-26 NOTE — ED PROVIDER NOTES
"Encounter Date: 9/25/2022       History     Chief Complaint   Patient presents with    Seizures     Hx epilepsy, states thinks she had seizure around 4 pm, patient states she does not remember what she was doing before or after seizure. States whole body hurts like after her usual seizures and had episode of urinary incontinence. Pt concerned bc she can't walk, bilateral leg weakness     32y  F with PMH of epilepsy (followed by Neuro, on lamotragine) presents with left sided headache and body pain that she feels after having a seizure. She says that she must have had a seizure while sleeping today. She woke up and had the pain and noted that she had urinated in her pants. She reports that this is how she usually feels after a seizure and that he seizures normally occur while she is sleeping so she has no memory of them. She says that she was feeling very weak and lightheaded afterwards. She felt like her legs "kept giving out" while she was trying to walk.   Denies any recent fever, tho she reports she sometimes feels hot and has to lay on a cold floor.     The history is provided by the patient and a parent.   Review of patient's allergies indicates:  No Known Allergies  Past Medical History:   Diagnosis Date    Idiopathic generalized epilepsy     Onset April 2019     History reviewed. No pertinent surgical history.  History reviewed. No pertinent family history.  Social History     Tobacco Use    Smoking status: Every Day     Packs/day: 0.50     Years: 10.00     Pack years: 5.00     Types: Cigarettes    Smokeless tobacco: Never   Substance Use Topics    Alcohol use: Yes    Drug use: Yes     Frequency: 3.0 times per week     Types: Marijuana     Review of Systems   Constitutional:  Negative for fever.   HENT:  Negative for sore throat.    Respiratory:  Negative for shortness of breath.    Cardiovascular:  Negative for chest pain.   Gastrointestinal:  Negative for nausea.   Genitourinary:  Negative for dysuria. "   Musculoskeletal:  Positive for myalgias. Negative for back pain.   Skin:  Negative for rash.   Neurological:  Positive for seizures and light-headedness. Negative for weakness.   Hematological:  Does not bruise/bleed easily.   All other systems reviewed and are negative.    Physical Exam     Initial Vitals   BP Pulse Resp Temp SpO2   09/25/22 2035 09/25/22 2035 09/25/22 2035 09/25/22 2034 09/25/22 2035   (!) 133/94 93 18 98.1 °F (36.7 °C) 98 %      MAP       --                Physical Exam    Nursing note and vitals reviewed.  Constitutional: She appears well-developed and well-nourished.   HENT:   Head: Normocephalic and atraumatic.   Mouth/Throat: Oropharynx is clear and moist.   Eyes: Conjunctivae and EOM are normal. Pupils are equal, round, and reactive to light.   Neck: Phonation normal. Neck supple.   Normal range of motion.  Cardiovascular:  Normal rate, regular rhythm, normal heart sounds and normal pulses.           Pulmonary/Chest: Breath sounds normal. No respiratory distress.   Abdominal: Abdomen is soft. There is no abdominal tenderness.   Musculoskeletal:         General: Normal range of motion.      Cervical back: Normal range of motion and neck supple.     Neurological: She is alert and oriented to person, place, and time. She has normal strength. No cranial nerve deficit or sensory deficit. GCS score is 15. GCS eye subscore is 4. GCS verbal subscore is 5. GCS motor subscore is 6.   Skin: Skin is warm and dry.       ED Course   Procedures  Labs Reviewed   CBC W/ AUTO DIFFERENTIAL - Abnormal; Notable for the following components:       Result Value    MCV 99 (*)     MCH 33.6 (*)     MPV 9.0 (*)     Gran # (ANC) 8.0 (*)     Gran % 74.6 (*)     All other components within normal limits   COMPREHENSIVE METABOLIC PANEL - Abnormal; Notable for the following components:    BUN 5 (*)     ALT 8 (*)     All other components within normal limits   CK   LAMOTRIGINE LEVEL   POCT URINE PREGNANCY          Imaging  Results    None          Medications   ketorolac injection 9.999 mg (9.999 mg Intravenous Given 9/25/22 2200)   prochlorperazine tablet 10 mg (10 mg Oral Given 9/25/22 2200)   diphenhydrAMINE injection 25 mg (25 mg Intravenous Given 9/25/22 2200)   sodium chloride 0.9% bolus 1,000 mL (1,000 mLs Intravenous New Bag 9/25/22 2115)     Medical Decision Making:   History:   Old Medical Records: I decided to obtain old medical records.  Initial Assessment:   Evaluation after seizure  Differential Diagnosis:   Dehydration   Electrolyte derangement   Medication non compliance   Epilepsy   Clinical Tests:   Lab Tests: Ordered and Reviewed  ED Management:  Patient is well appearing, hemodynamically stable. She has no focal neuro deficits. Her symptoms seem consistent with her normal post seizure symptoms. Reviewed neuro notes and noted some migraine component to her seizures. Will give medication to treat this. Final dispo pending reassessment after meds. Anticipate DC                         Clinical Impression:   Final diagnoses:  [G40.909] Recurrent seizures (Primary)      ED Disposition Condition    Eloped Stable                Lucien Mcgill MD  09/26/22 6271

## 2022-09-26 NOTE — ED TRIAGE NOTES
33 y/o F presents to ER with c/c seizures. Pt states that she had a seizure while sleeping earlier today and came to the ER because she was unable to walk afterwards with L sided weakness which was a new development. She also claims to probably have hit her head. Pt endorses L side weakness, numbness, and tingling, nausea and vomiting, and photophobia. Pt denies c/p and SOB. Pt has h/x epilepsy.

## 2022-09-26 NOTE — TELEPHONE ENCOUNTER
Patient call requesting to be seen immediately because she states she cannot move her legs. Patient did go to the ER 9/25 but left AMA because they were taking too long. I advised the patient that the first available would be 10/11 bit that I would add her to the waitlist for a cancellation. She was a little upset so I advised her to go to back to the ER to be evaluated if she feels she needs immediate medical attention. I did add patient to Lissy's schedule for 10/11 @ 9 am

## 2022-09-28 LAB — LAMOTRIGINE SERPL-MCNC: 19.8 UG/ML (ref 2–15)

## 2022-09-30 ENCOUNTER — TELEPHONE (OUTPATIENT)
Dept: NEUROLOGY | Facility: CLINIC | Age: 32
End: 2022-09-30
Payer: MEDICAID

## 2022-09-30 NOTE — TELEPHONE ENCOUNTER
----- Message from Sandy Fernandes sent at 9/30/2022  8:05 AM CDT -----  Pt would like to be called back regarding  an earlier appt unble to walk    Pt can be reached at  [phone]

## 2022-10-11 ENCOUNTER — LAB VISIT (OUTPATIENT)
Dept: LAB | Facility: HOSPITAL | Age: 32
End: 2022-10-11
Payer: MEDICAID

## 2022-10-11 ENCOUNTER — OFFICE VISIT (OUTPATIENT)
Dept: NEUROLOGY | Facility: CLINIC | Age: 32
End: 2022-10-11
Payer: MEDICAID

## 2022-10-11 VITALS
DIASTOLIC BLOOD PRESSURE: 82 MMHG | SYSTOLIC BLOOD PRESSURE: 118 MMHG | HEIGHT: 61 IN | BODY MASS INDEX: 27.38 KG/M2 | HEART RATE: 99 BPM | WEIGHT: 145 LBS

## 2022-10-11 DIAGNOSIS — R41.3 MEMORY DISTURBANCE: ICD-10-CM

## 2022-10-11 DIAGNOSIS — G40.219 LOCALIZATION-RELATED (FOCAL) (PARTIAL) SYMPTOMATIC EPILEPSY AND EPILEPTIC SYNDROMES WITH COMPLEX PARTIAL SEIZURES, INTRACTABLE, WITHOUT STATUS EPILEPTICUS: Primary | ICD-10-CM

## 2022-10-11 DIAGNOSIS — G40.219 LOCALIZATION-RELATED (FOCAL) (PARTIAL) SYMPTOMATIC EPILEPSY AND EPILEPTIC SYNDROMES WITH COMPLEX PARTIAL SEIZURES, INTRACTABLE, WITHOUT STATUS EPILEPTICUS: ICD-10-CM

## 2022-10-11 DIAGNOSIS — G43.119 INTRACTABLE MIGRAINE WITH AURA WITHOUT STATUS MIGRAINOSUS: ICD-10-CM

## 2022-10-11 DIAGNOSIS — H43.393 VITREOUS FLOATERS OF BOTH EYES: ICD-10-CM

## 2022-10-11 PROCEDURE — 99999 PR PBB SHADOW E&M-EST. PATIENT-LVL III: CPT | Mod: PBBFAC,,,

## 2022-10-11 PROCEDURE — 3074F SYST BP LT 130 MM HG: CPT | Mod: CPTII,,,

## 2022-10-11 PROCEDURE — 99417 PR PROLONGED SVC, OUTPT, W/WO DIRECT PT CONTACT,  EA ADDTL 15 MIN: ICD-10-PCS | Mod: S$PBB,,,

## 2022-10-11 PROCEDURE — 36415 COLL VENOUS BLD VENIPUNCTURE: CPT

## 2022-10-11 PROCEDURE — 99215 OFFICE O/P EST HI 40 MIN: CPT | Mod: S$PBB,,,

## 2022-10-11 PROCEDURE — 3079F PR MOST RECENT DIASTOLIC BLOOD PRESSURE 80-89 MM HG: ICD-10-PCS | Mod: CPTII,,,

## 2022-10-11 PROCEDURE — 99213 OFFICE O/P EST LOW 20 MIN: CPT | Mod: PBBFAC

## 2022-10-11 PROCEDURE — 99417 PROLNG OP E/M EACH 15 MIN: CPT | Mod: S$PBB,,,

## 2022-10-11 PROCEDURE — 99215 PR OFFICE/OUTPT VISIT, EST, LEVL V, 40-54 MIN: ICD-10-PCS | Mod: S$PBB,,,

## 2022-10-11 PROCEDURE — 3008F PR BODY MASS INDEX (BMI) DOCUMENTED: ICD-10-PCS | Mod: CPTII,,,

## 2022-10-11 PROCEDURE — 80175 DRUG SCREEN QUAN LAMOTRIGINE: CPT

## 2022-10-11 PROCEDURE — 3008F BODY MASS INDEX DOCD: CPT | Mod: CPTII,,,

## 2022-10-11 PROCEDURE — 3079F DIAST BP 80-89 MM HG: CPT | Mod: CPTII,,,

## 2022-10-11 PROCEDURE — 3074F PR MOST RECENT SYSTOLIC BLOOD PRESSURE < 130 MM HG: ICD-10-PCS | Mod: CPTII,,,

## 2022-10-11 PROCEDURE — 99999 PR PBB SHADOW E&M-EST. PATIENT-LVL III: ICD-10-PCS | Mod: PBBFAC,,,

## 2022-10-11 RX ORDER — LAMOTRIGINE 300 MG/1
300 TABLET, EXTENDED RELEASE ORAL DAILY
Qty: 90 TABLET | Refills: 3 | Status: SHIPPED | OUTPATIENT
Start: 2022-10-11 | End: 2023-10-31 | Stop reason: SDUPTHER

## 2022-10-11 NOTE — PATIENT INSTRUCTIONS
Mark Guzman was evaluated in a specialty clinic today at which time his blood pressure was noted to be elevated.  He  has been advised to follow up with his primary provider or with a nurse only visit. We are also routing this message to his primary provider as an FYI. You came [facility] because of your seizure disorder. Your seizures are controlled on lamotrigine 300mg nightly.  Please continue the same medication at the same dose. I sent in a refill.     Do not miss any doses of medication. If a dose of medication is missed, take it as soon as it is remembered even if that means doubling up on the dose. Please get a lab test to check out the blood level of medication. Take folic acid 1mg daily. Get regular sleep. Go to sleep at the same time and wake up at the same time every day. People with epilepsy require more sleep than people without epilepsy.  Sleeping 10-12 hours a day can be normal for a person with epilepsy.  Every seizure makes it harder to prevent the next seizure. Epilepsy is associated with SUDEP, or sudden unexpected death in epilepsy.  The risk is significantly higher if convulsive seizures are not well controlled. For more information, check out these websites: https://www.epilepsy.com/, https://www.epilepsyalliance[place].org/, www.lucio-epilepsy.org, www.womenandepilepsy.org.      I have placed a referral for an evaluation by our neurocognitive team. They will help evaluate your memory. Please call [phone] during normal working hours and ask for Lise Vega in order to schedule this.     I also put in a referral for you to speak with our  Kash. He is also a licensed therapist. He will message you over the portal to set up a virtual appointment. I think this will be very helpful for you to manage stress and talk to someone about everything that's been going on.     I put in a referral for you to see a headache specialist. They will call you to schedule this appointment.     I also put in a referral for you to see an eye doctor. They should also call you within a week to schedule this appointment. If you do not hear from them, please let me know.     Per [place] law, no episodes of loss of consciousness for 6 months before driving.   Avoid dangerous situations.  For example, no baths/pools alone, no heights, no power tools.  Wear a bike helmet.  If breakthrough seizures occur that are different in character, frequency, or duration from normal episodes, please patient portal me or call the office and we will decide the next steps. If multiple seizures occur in a row without return back to baseline, 911 needs to be called.     Return to clinic in 3 months or sooner with issues.  Please patient portal with any questions or concerns.    Lissy Box PA-C   Neurology-Epilepsy  [facility]
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
## 2022-10-13 LAB — LAMOTRIGINE SERPL-MCNC: 4.9 UG/ML (ref 2–15)

## 2022-10-16 DIAGNOSIS — G40.209 COMPLEX PARTIAL SEIZURES EVOLVING TO GENERALIZED TONIC-CLONIC SEIZURES: ICD-10-CM

## 2022-10-17 RX ORDER — FOLIC ACID 1 MG/1
TABLET ORAL
Qty: 90 TABLET | Refills: 3 | Status: SHIPPED | OUTPATIENT
Start: 2022-10-17

## 2022-10-18 NOTE — TELEPHONE ENCOUNTER
Folic acid 1 mg daily    Kim Gonzalez MD PhD  Neurology-Epilepsy  [facility].

## 2022-11-17 ENCOUNTER — TELEPHONE (OUTPATIENT)
Dept: OPHTHALMOLOGY | Facility: CLINIC | Age: 32
End: 2022-11-17
Payer: MEDICAID

## 2022-11-17 NOTE — TELEPHONE ENCOUNTER
----- Message from Gabby Correa sent at 11/17/2022  9:10 AM CST -----  Regarding: appt  Contact: [phone]  Pt is requesting a NP appt with referral in system. Please call to discuss further.

## 2022-11-29 DIAGNOSIS — F32.A ANXIETY AND DEPRESSION: ICD-10-CM

## 2022-11-29 DIAGNOSIS — F41.9 ANXIETY AND DEPRESSION: ICD-10-CM

## 2022-11-29 RX ORDER — SERTRALINE HYDROCHLORIDE 50 MG/1
TABLET, FILM COATED ORAL
Qty: 90 TABLET | Refills: 3 | OUTPATIENT
Start: 2022-11-29

## 2022-11-29 NOTE — TELEPHONE ENCOUNTER
Refused sertraline sure scripts refill request -> the patient is no longer taking this medication.    Kim Gonzalez MD PhD  Neurology-Epilepsy  [facility].

## 2023-03-15 ENCOUNTER — OFFICE VISIT (OUTPATIENT)
Dept: NEUROLOGY | Facility: CLINIC | Age: 33
End: 2023-03-15
Payer: MEDICAID

## 2023-03-15 ENCOUNTER — TELEPHONE (OUTPATIENT)
Dept: NEUROLOGY | Facility: CLINIC | Age: 33
End: 2023-03-15
Payer: MEDICAID

## 2023-03-15 VITALS
HEART RATE: 88 BPM | BODY MASS INDEX: 27.14 KG/M2 | DIASTOLIC BLOOD PRESSURE: 81 MMHG | SYSTOLIC BLOOD PRESSURE: 114 MMHG | WEIGHT: 143.75 LBS | HEIGHT: 61 IN

## 2023-03-15 DIAGNOSIS — H53.9 VISION CHANGES: ICD-10-CM

## 2023-03-15 DIAGNOSIS — G43.111 INTRACTABLE MIGRAINE WITH AURA WITH STATUS MIGRAINOSUS: ICD-10-CM

## 2023-03-15 DIAGNOSIS — H43.393 VITREOUS FLOATERS OF BOTH EYES: ICD-10-CM

## 2023-03-15 DIAGNOSIS — G40.219 COMPLEX PARTIAL EPILEPSY WITH GENERALIZATION AND WITH INTRACTABLE EPILEPSY: Primary | ICD-10-CM

## 2023-03-15 PROCEDURE — 3008F BODY MASS INDEX DOCD: CPT | Mod: CPTII,,,

## 2023-03-15 PROCEDURE — 99999 PR PBB SHADOW E&M-EST. PATIENT-LVL III: CPT | Mod: PBBFAC,,,

## 2023-03-15 PROCEDURE — 3074F PR MOST RECENT SYSTOLIC BLOOD PRESSURE < 130 MM HG: ICD-10-PCS | Mod: CPTII,,,

## 2023-03-15 PROCEDURE — 3079F DIAST BP 80-89 MM HG: CPT | Mod: CPTII,,,

## 2023-03-15 PROCEDURE — 3008F PR BODY MASS INDEX (BMI) DOCUMENTED: ICD-10-PCS | Mod: CPTII,,,

## 2023-03-15 PROCEDURE — 99999 PR PBB SHADOW E&M-EST. PATIENT-LVL III: ICD-10-PCS | Mod: PBBFAC,,,

## 2023-03-15 PROCEDURE — 99215 PR OFFICE/OUTPT VISIT, EST, LEVL V, 40-54 MIN: ICD-10-PCS | Mod: S$PBB,,,

## 2023-03-15 PROCEDURE — 99215 OFFICE O/P EST HI 40 MIN: CPT | Mod: S$PBB,,,

## 2023-03-15 PROCEDURE — 1159F PR MEDICATION LIST DOCUMENTED IN MEDICAL RECORD: ICD-10-PCS | Mod: CPTII,,,

## 2023-03-15 PROCEDURE — 1159F MED LIST DOCD IN RCRD: CPT | Mod: CPTII,,,

## 2023-03-15 PROCEDURE — 99213 OFFICE O/P EST LOW 20 MIN: CPT | Mod: PBBFAC

## 2023-03-15 PROCEDURE — 3074F SYST BP LT 130 MM HG: CPT | Mod: CPTII,,,

## 2023-03-15 PROCEDURE — 3079F PR MOST RECENT DIASTOLIC BLOOD PRESSURE 80-89 MM HG: ICD-10-PCS | Mod: CPTII,,,

## 2023-03-15 RX ORDER — ZONISAMIDE 100 MG/1
100 CAPSULE ORAL NIGHTLY
Qty: 30 CAPSULE | Refills: 11 | Status: SHIPPED | OUTPATIENT
Start: 2023-03-15 | End: 2023-10-26

## 2023-03-15 RX ORDER — ONDANSETRON 4 MG/1
4 TABLET, ORALLY DISINTEGRATING ORAL EVERY 6 HOURS PRN
Qty: 30 TABLET | Refills: 0 | Status: SHIPPED | OUTPATIENT
Start: 2023-03-15 | End: 2023-10-26

## 2023-03-15 RX ORDER — SERTRALINE HYDROCHLORIDE 50 MG/1
50 TABLET, FILM COATED ORAL
COMMUNITY
Start: 2022-11-28 | End: 2023-10-26
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
## 2023-03-15 NOTE — TELEPHONE ENCOUNTER
----- Message from Lissy Box PA-C sent at 3/15/2023  9:58 AM CDT -----  Hello,     Just letting you know I placed a referral to [facility] for this patient to be evaluated for her chronic migraines.     Thank you!    AUDELIA Arnold

## 2023-03-15 NOTE — PATIENT INSTRUCTIONS
You came to Epilepsy Clinic because of your seizure disorder. Your seizures are controlled on lamotrigine 300mg nightly. Please continue the same medication at the same dose.    We will also add a medication called zonisamide to help with seizures and headaches. Please take 1 tablet at night before bed.      Do not miss any doses of medication. If a dose of medication is missed, take it as soon as it is remembered even if that means doubling up on the dose. Please get a lab test to check out the blood level of medication. Take folic acid 1mg daily. Get regular sleep. Go to sleep at the same time and wake up at the same time every day. People with epilepsy require more sleep than people without epilepsy.  Sleeping 10-12 hours a day can be normal for a person with epilepsy.  Every seizure makes it harder to prevent the next seizure. Epilepsy is associated with SUDEP, or sudden unexpected death in epilepsy.  The risk is significantly higher if convulsive seizures are not well controlled. For more information, check out these websites: https://www.epilepsy.com/, https://www.epilepsyallianceamerica.org/, www.lucio-epilepsy.org, www.womenandepilepsy.org.       I put in a referral for you to see a headache specialist. They will call you to schedule this appointment. For migraines, please write a headache diary with special attention to headache symptoms, durations, triggers, medications used and bring this to the next clinic visit. For posterior head and neck pain, try a rolled towel underneath your neck while you sleep for comfort. Ice/heat/massage. Physical Therapy for neck muscle spasms. Please try yoga to help take some of the strain off your back and to work on strength and flexibility.  For pain, you can take over-the-counter ibuprofen (max dose 400-600 mg every 8 hr)/naproxen (max dose 500 mg every 12 hr) or an acetaminophen formulation like Excedrin, Tylenol, or Goody's powder (max dose 1 g every 8 hr). You can also  use Sumatriptan. Please take one tablet right at aura/headache onset. You may take a second tablet after an hour if the pain persists. It is important that you do not take any of these medications more than one full day every 4 days (7 days a month).  If you take them more frequently than that, it can contribute to a medication overuse headache as well as other issues such as a GI upset and/or kidney/liver injury.           I also put in a referral for you to see an eye doctor. They should also call you within a week to schedule this appointment. If you do not hear from them, please let me know.      Per [place] law, no episodes of loss of consciousness for 6 months before driving.  Avoid dangerous situations.  For example, no baths/pools alone, no heights, no power tools.  Wear a bike helmet.  If breakthrough seizures occur that are different in character, frequency, or duration from normal episodes, please patient portal me or call the office and we will decide the next steps. If multiple seizures occur in a row without return back to baseline, 911 needs to be called.      Return to clinic in 6 months or sooner with issues.  Please patient portal with any questions or concerns.     Lissy Box PA-C   Neurology-Epilepsy  [facility]

## 2023-05-23 ENCOUNTER — TELEPHONE (OUTPATIENT)
Dept: NEUROLOGY | Facility: HOSPITAL | Age: 33
End: 2023-05-23
Payer: MEDICAID

## 2023-05-23 ENCOUNTER — PATIENT MESSAGE (OUTPATIENT)
Dept: NEUROLOGY | Facility: CLINIC | Age: 33
End: 2023-05-23
Payer: MEDICAID

## 2023-05-23 ENCOUNTER — TELEPHONE (OUTPATIENT)
Dept: NEUROLOGY | Facility: CLINIC | Age: 33
End: 2023-05-23
Payer: MEDICAID

## 2023-05-23 NOTE — TELEPHONE ENCOUNTER
Responded over the portal.  Asked her if she would prefer to discuss her issue over the portal or come in for an appointment.  Awaiting her response.     Kim Gonzalez MD PhD [facility]  Neurology-Epilepsy  [facility].      ----- Message from Lele Mott sent at 5/23/2023  4:30 PM CDT -----  Regarding: adv  Contact: @[phone]  Pt calling to speak with dr gonzalez about portal message states she wouldl [place] a call instead of using portal... pls call and [phone]

## 2023-05-23 NOTE — TELEPHONE ENCOUNTER
Response requesting more information about the question sent over the portal.     Kim Gonzalez MD PhD [facility]  Neurology-Epilepsy  [facility].        ----- Message from Rachele Roberts MA sent at 5/23/2023 12:29 PM CDT -----  Regarding: FW: medication  Contact: [phone]    ----- Message -----  From: Deandra Mendiola MA  Sent: 5/23/2023  12:25 PM CDT  To: [facility] Staff  Subject: medication                                       Pt calling to get a  different medication  for her migraine stated that the zonisamide (ZONEGRAN) 100 MG Cap makes  her  nauseated and would  like  the Ibuprofen 800 mg works well please call  [phone]

## 2023-06-05 ENCOUNTER — PATIENT MESSAGE (OUTPATIENT)
Dept: NEUROLOGY | Facility: CLINIC | Age: 33
End: 2023-06-05
Payer: MEDICAID

## 2023-06-05 ENCOUNTER — TELEPHONE (OUTPATIENT)
Dept: NEUROLOGY | Facility: CLINIC | Age: 33
End: 2023-06-05
Payer: MEDICAID

## 2023-06-05 DIAGNOSIS — G43.111 INTRACTABLE MIGRAINE WITH AURA WITH STATUS MIGRAINOSUS: Primary | ICD-10-CM

## 2023-06-05 NOTE — TELEPHONE ENCOUNTER
Referral to Neurology/headache team    Kim Gonzalez MD PhD [facility]  Neurology-Epilepsy  [facility].

## 2023-06-05 NOTE — TELEPHONE ENCOUNTER
Pt stated she spoke to someone and got rescheduled for a sooner appt. Pt has been rescheduled for June 12 with Maddy Kellogg.     ----- Message from Jermaine Larsen sent at 6/5/2023  2:34 PM CDT -----  Regarding: appt on 9/6/2023  Contact: PT @ [phone]  Pt is calling to speak to someone in the office; asking for a sooner appt than what they are scheduled for. Pt is already on the wait list; no available appts in Epic that are coming up soon. Pt is asking to speak to someone in the office. Please call to advise. Thanks.    Reason for sooner appt: pt states the Tylenol and Zonegran 100 mg is not working and it is only making her sleepy. Pt is asking to please schedule her sooner.     Pt's DX: Intractable migraine with aura with status migrainosus [G43.111]    When is the first available appointment? 9/6/2023    Communication Preference: PT @ [phone]    Additional Information:  Pt states that rx: zonisamide (ZONEGRAN) 100 MG Cap is not working for her at all. Pt states that Dr. Gonzalez advised her to schedule with Neurology headache.

## 2023-06-06 NOTE — TELEPHONE ENCOUNTER
Refractory headaches -> appreciate headache team.    Kim Gonzalez MD PhD FACNS  Neurology-Epilepsy  [facility].        ----- Message from Ilene Vásquez sent at 6/5/2023  2:21 PM CDT -----  Contact: [phone]  Calling to speak with provider regarding medication needed for migraines. Please call to confirm prescription has been called in to [facility].    [id number] - [facility] [address] AT [facility] [address]  [address]  [facility] [address]  Phone: [phone] Fax: [phone]

## 2023-06-12 ENCOUNTER — OFFICE VISIT (OUTPATIENT)
Dept: NEUROLOGY | Facility: CLINIC | Age: 33
End: 2023-06-12
Payer: MEDICAID

## 2023-06-12 DIAGNOSIS — G43.011 INTRACTABLE MIGRAINE WITHOUT AURA AND WITH STATUS MIGRAINOSUS: ICD-10-CM

## 2023-06-12 PROCEDURE — 1160F PR REVIEW ALL MEDS BY PRESCRIBER/CLIN PHARMACIST DOCUMENTED: ICD-10-PCS | Mod: CPTII,95,, | Performed by: PHYSICIAN ASSISTANT

## 2023-06-12 PROCEDURE — 1160F RVW MEDS BY RX/DR IN RCRD: CPT | Mod: CPTII,95,, | Performed by: PHYSICIAN ASSISTANT

## 2023-06-12 PROCEDURE — 1159F MED LIST DOCD IN RCRD: CPT | Mod: CPTII,95,, | Performed by: PHYSICIAN ASSISTANT

## 2023-06-12 PROCEDURE — 99214 PR OFFICE/OUTPT VISIT, EST, LEVL IV, 30-39 MIN: ICD-10-PCS | Mod: 95,,, | Performed by: PHYSICIAN ASSISTANT

## 2023-06-12 PROCEDURE — 1159F PR MEDICATION LIST DOCUMENTED IN MEDICAL RECORD: ICD-10-PCS | Mod: CPTII,95,, | Performed by: PHYSICIAN ASSISTANT

## 2023-06-12 PROCEDURE — 99214 OFFICE O/P EST MOD 30 MIN: CPT | Mod: 95,,, | Performed by: PHYSICIAN ASSISTANT

## 2023-06-12 RX ORDER — IBUPROFEN 800 MG/1
800 TABLET ORAL EVERY 8 HOURS PRN
Qty: 20 TABLET | Refills: 2 | Status: SHIPPED | OUTPATIENT
Start: 2023-06-12 | End: 2023-09-12

## 2023-06-12 NOTE — PROGRESS NOTES
New Patient     The patient location is: [place]  The chief complaint leading to consultation is: headache     Visit type: audiovisual    Face to Face time with patient: 17 min  30 minutes of total time spent on the encounter, which includes face to face time and non-face to face time preparing to see the patient (eg, review of tests), Obtaining and/or reviewing separately obtained history, Documenting clinical information in the electronic or other health record, Independently interpreting results (not separately reported) and communicating results to the patient/family/caregiver, or Care coordination (not separately reported).     Each patient to whom he or she provides medical services by telemedicine is:  (1) informed of the relationship between the physician and patient and the respective role of any other health care provider with respect to management of the patient; and (2) notified that he or she may decline to receive medical services by telemedicine and may withdraw from such care at any time.    Notes:       SUBJECTIVE:  Patient ID: Ifrah Valentine   MRN: [id number]  Referred By: Dr. Kim Gonzalez  Chief Complaint: Headache      History of Present Illness:   33 y.o. female with migraines, seizures, anxiety, depression, who presents to virtual visit alone for evaluation of headaches.   PMHx negative for TBI, Meningitis, Aneurysms, Kidney Stones, asthma, GI bleed, osteoporosis, CAD/MI, CVA/TIA, DM, cancer  Family Hx + for Migraines in mother    Pt has a h/o ha's for yrs that worsened in the last 1-2 months. Only change noted at the time was increased stress. Ha's are now daily w/ moments of relief.   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    Current Medications:    Current Outpatient Medications:     DRYSOL DAB-O-MATIC 20 % external solution, Apply topically nightly., Disp: , Rfl:     erythromycin (ROMYCIN) ophthalmic ointment, Apply 1/2 inch ribbon to the inner lower eyelid and then gently massage onto the eye with the eye closed 3 times a day for 1 week., Disp: 3.5 g, Rfl: 0    fluticasone propionate (FLONASE) 50 mcg/actuation nasal spray, 2 sprays (100 mcg total) by Each Nostril route daily as needed., Disp: 15 g, Rfl: 0    folic acid (FOLVITE) 1 MG tablet, TAKE 1 TABLET(1 MG) BY MOUTH EVERY DAY, Disp: 90 tablet, Rfl: 3    ibuprofen (ADVIL,MOTRIN) 800 MG tablet, Take 1 tablet (800 mg total) by mouth every 8 (eight) hours as needed (for headache). Do not take more than 3,200 mg in a day. Or more than 2-3 days in a week., Disp: 20 tablet, Rfl: 2    lamotrigine XR (LAMICTAL XR) 300 mg XR tablet, Take 1 tablet (300 mg total) by mouth once daily., Disp: 90 tablet, Rfl: 3    ondansetron (ZOFRAN-ODT) 4 MG TbDL, Take 1 tablet (4 mg total) by mouth every 6 (six) hours as needed (nausea)., Disp: 30 tablet, Rfl: 0    sertraline (ZOLOFT) 50 MG tablet, Take 50 mg by mouth., Disp: , Rfl:     tretinoin (RETIN-A) 0.025 % cream, Apply topically nightly., Disp: , Rfl:     zonisamide (ZONEGRAN) 100 MG Cap, Take 1 capsule (100 mg total) by mouth every evening., Disp: 30 capsule, Rfl: 11    Review of Systems - as per HPI, otherwise a balanced 10 systems review is negative.    OBJECTIVE:  Vitals:  There were no vitals taken for this visit.    Physical Exam: certain limitations due to video visit platform, able to perform the following with the patient's  assistance:  Constitutional: she appears well-developed and well-nourished. she is well groomed. NAD  HENT:    Head: Normocephalic and atraumatic,  Frontalis was NTTP, temporalis was NTTP   Eyes: Conjunctivae and EOM are normal. Pupils are equal and round  Neck: Occiput and trapezius NTTP   Musculoskeletal: Normal range of motion.   Psychiatric: Normal mood and affect.     Neuro exam:    Mental status:  The patient is alert and oriented to person, place and time.  Language is intact and fluent  Remote and recent memory are intact  Normal attention and concentration  Mood is stable    Cranial Nerves:  Extraocular movements are intact and without nystagmus.    Facial movement is symmetric.  Facial sensation is intact.    Tongue in midline without fasciculation.   FROM of neck in all (6) directions without pain  Shoulder shrug symmetrical.                       Sensory:  RUE  intact light touch  LUE intact light touch  RLE intact light touch  LLE intact light touch    Review of Data:   Notes from neuro reviewed   Labs:  No visits with results within 3 Month(s) from this visit.   Latest known visit with results is:   Lab Visit on 10/11/2022   Component Date Value Ref Range Status    Lamotrigine Lvl 10/11/2022 4.9  2.0 - 15.0 ug/mL Final     Imaging:  Results for orders placed or performed during the hospital encounter of 08/14/21   CT Head Without Contrast    Narrative    EXAMINATION:  CT HEAD WITHOUT CONTRAST    CLINICAL HISTORY:  Headache, acute, normal neuro exam;    TECHNIQUE:  Low dose axial images were obtained through the head.  Coronal and sagittal reformations were also performed. Contrast was not administered.    COMPARISON:  None.    FINDINGS:  There is no acute intracranial hemorrhage, hydrocephalus, midline shift or mass effect. Gray-white matter differentiation appears maintained. The basal cisterns are patent. The mastoid air cells and paranasal sinuses are clear of acute process. The visualized bones of  the calvarium demonstrate no acute osseous abnormality.      Impression    No CT evidence of acute intracranial abnormality. Clinical correlation and further evaluation as warranted.      Electronically signed by: Fly Guerra MD  Date:    08/14/2021  Time:    05:25     Note: I have independently reviewed any/all imaging/labs/tests and agree with the report (s) as documented.  Any discrepancies will be as noted/demarcated by free text.  AUDELIA PETERS 6/12/2023    ASSESSMENT:  1. Intractable migraine without aura and with status migrainosus          PLAN:  Discussed symptoms appear to be consistent with migraines. Discussed this with patient. Discussed multiple options w/ pt including daily oral ppx's, increasing zonisamide, cgrp inhibitors, botox, gepants, and steroid taper. Pt defers these options and verbalizes a wish to focus on non-medication options. Discussed non-medication options such as vitamins, trigger mgmt, and neuro-modulation devices. Pt agreeable to these. Pt also states she wishes to only take ibuproefn 800mg prn for an abortive option. Counseled on risks including overuse and to limit to <2-3 days in a week, pt verbalizes her understanding.   Pt will try mag ox and trigger mgmt, she will also continue ibuprofen 800mg limited to <2-3 days in a week, and f/up prn.     Orders Placed This Encounter    ibuprofen (ADVIL,MOTRIN) 800 MG tablet       I have discussed realistic goals of care with patient at length as well as medication options, and need for lifestyle adjustment. I have explained that treatment will take time. We have agreed that the goal will be to reduce frequency/intensity/quantity of HA, not to be completely HA free. I have explained my non narcotic policy regarding headache treatment.    Patient agreeable to work on lifestyle adjustments.    Discussed potential for teratogenicity with treatment, patient understands if her family planning status should change she will contact office  immediately and we will safely adjust medications as needed.     Questions and concerns were sought and answered to the patient's stated verbal satisfaction.  The patient verbalizes understanding and agreement with the above stated treatment plan.     CC: Primary Doctor Lissy Kellogg PA-C  [facility]  [phone]    Dr. Bond was available during today's encounter.

## 2023-06-12 NOTE — PATIENT INSTRUCTIONS
Supplements for Migraine:  1. Magnesium Oxide - 400mg by mouth daily  2. Riboflavin (Vitamin B2) - 400mg by mouth daily  3. Coenzyme Q10 - 200mg tablet by mouth daily    - Please download Migraine Javad timothy on phone and begin tracking your headaches

## 2023-09-28 ENCOUNTER — TELEPHONE (OUTPATIENT)
Dept: NEUROLOGY | Facility: CLINIC | Age: 33
End: 2023-09-28
Payer: MEDICAID

## 2023-09-28 ENCOUNTER — NURSE TRIAGE (OUTPATIENT)
Dept: ADMINISTRATIVE | Facility: CLINIC | Age: 33
End: 2023-09-28
Payer: MEDICAID

## 2023-09-28 ENCOUNTER — ON-DEMAND VIRTUAL (OUTPATIENT)
Dept: URGENT CARE | Facility: CLINIC | Age: 33
End: 2023-09-28
Payer: MEDICAID

## 2023-09-28 ENCOUNTER — PATIENT MESSAGE (OUTPATIENT)
Dept: URGENT CARE | Facility: CLINIC | Age: 33
End: 2023-09-28

## 2023-09-28 NOTE — TELEPHONE ENCOUNTER
Patient states she has had 2 seizures but tonight her muscles are restricting movement after seizure. She is requesting a muscle relaxer. Patient states she is concerned that her post ictal is lasting so much longer.  I have advised patient to the ED. She states she will.    Reason for Disposition   Patient sounds very sick or weak to the triager    Additional Information   Negative: First seizure ever   Negative: [1] Seizure AND [2] lasts > 5 minutes   Negative: [1] Two or more seizures AND [2] stays confused between seizures   Negative: Bluish (or gray) lips or face now   Negative: Head injury caused the seizure   Negative: Pregnant   Negative: Postpartum (from 0 to 6 weeks after delivery)   Negative: Known poisoning or overdose   Negative: Seizure in a swimming pool   Negative: Diabetes mellitus  (Exception: Now alert, acting normal, and has normal blood glucose [e.g., > 70 mg/dL or 3.9 mmol/L].)   Negative: [1] Unresponsive (can't be awakened) after the seizure stops AND [2] persists > 5 minutes   Negative: [1] Acting confused (e.g., disoriented, slurred speech) after the seizure stops AND [2] persists > 30 minutes   Negative: Sounds like a life-threatening emergency to the triager   Negative: Severe headache  (Note: Most people have a headache after a seizure.)   Negative: Second seizure occurs on the same day   Negative: Substance use (drug use) or unhealthy alcohol use, known or suspected   Negative: Fever > 100.4 F (38.0 C)   Negative: [1] Wants to sleep after the seizure AND [2] persists much longer than usual    Protocols used: Atdhmey-A-QE

## 2023-09-28 NOTE — TELEPHONE ENCOUNTER
----- Message from Martita Orosco sent at 9/28/2023  8:05 AM CDT -----  Regarding: appt  Contact: pt [phone]  PATIENTCALL     Pt is calling to speak with someone in provider office regarding she has been having seizures more regular states that she have an appt in Jan but, she states she needs to come in sooner she is asking for a return call please call pt at  [phone] she also wants to know if she can get some muscle relaxer

## 2023-09-28 NOTE — PROGRESS NOTES
Erroneous encounter.  Patient was connected to video but audio and visual were both disabled.    This encounter was created in error - please disregard.

## 2023-09-28 NOTE — TELEPHONE ENCOUNTER
Spoke to patient. Scheduled with Catalina on 10/26. Patient c/o muscle cramps and was asking for muscle relaxers. Patient advised that Lissy only handles her seizures and that she needs to get established with a primary care provider to treat anything she is experiencing that is not associated with seizures

## 2023-09-29 ENCOUNTER — PATIENT MESSAGE (OUTPATIENT)
Dept: NEUROLOGY | Facility: CLINIC | Age: 33
End: 2023-09-29
Payer: MEDICAID

## 2023-09-29 ENCOUNTER — TELEPHONE (OUTPATIENT)
Dept: NEUROLOGY | Facility: HOSPITAL | Age: 33
End: 2023-09-29
Payer: MEDICAID

## 2023-09-29 DIAGNOSIS — G40.219 COMPLEX PARTIAL EPILEPSY WITH GENERALIZATION AND WITH INTRACTABLE EPILEPSY: Primary | ICD-10-CM

## 2023-09-29 DIAGNOSIS — G40.919 BREAKTHROUGH SEIZURE: ICD-10-CM

## 2023-09-29 RX ORDER — TIZANIDINE 2 MG/1
2 TABLET ORAL EVERY 6 HOURS PRN
Qty: 30 TABLET | Refills: 2 | Status: SHIPPED | OUTPATIENT
Start: 2023-09-29 | End: 2024-01-22

## 2023-09-30 NOTE — TELEPHONE ENCOUNTER
Breakthrough seizure    Tizanidine  Magic mouthwash    Follow-up scheduled for 10/26/2023    Kim Gonzalez MD PhD FACNS  Neurology-Epilepsy  [facility].      ----- Message from Rachele Roberts MA sent at 9/29/2023  9:39 AM CDT -----  Regarding: FW: pt advice  Contact: [phone]    ----- Message -----  From: Sandra Estrada  Sent: 9/29/2023   8:05 AM CDT  To: Isauro Yuan Staff  Subject: pt advice                                        Pt left a message yesterday with no response. Pt had a seizure Wed night around 11. Pt wants to update provider. Pt is trying to see if muscle relaxer's can be prescribed, and mouth wash as well. Pt asking to speak to someone on staff in regards to change in condition with direction on next step of care. Pls call to discuss.

## 2023-10-05 ENCOUNTER — TELEPHONE (OUTPATIENT)
Dept: OPHTHALMOLOGY | Facility: CLINIC | Age: 33
End: 2023-10-05
Payer: MEDICAID

## 2023-10-10 ENCOUNTER — OFFICE VISIT (OUTPATIENT)
Dept: OPHTHALMOLOGY | Facility: CLINIC | Age: 33
End: 2023-10-10
Payer: MEDICAID

## 2023-10-10 DIAGNOSIS — H04.123 DRY EYE SYNDROME OF BOTH EYES: ICD-10-CM

## 2023-10-10 DIAGNOSIS — G40.209 COMPLEX PARTIAL SEIZURES EVOLVING TO GENERALIZED TONIC-CLONIC SEIZURES: ICD-10-CM

## 2023-10-10 DIAGNOSIS — H43.823 VITREOMACULAR ADHESION OF BOTH EYES: Primary | ICD-10-CM

## 2023-10-10 PROCEDURE — 1159F MED LIST DOCD IN RCRD: CPT | Mod: CPTII,,, | Performed by: OPHTHALMOLOGY

## 2023-10-10 PROCEDURE — 92004 COMPRE OPH EXAM NEW PT 1/>: CPT | Mod: S$PBB,,, | Performed by: OPHTHALMOLOGY

## 2023-10-10 PROCEDURE — 99999 PR PBB SHADOW E&M-EST. PATIENT-LVL III: ICD-10-PCS | Mod: PBBFAC,,, | Performed by: OPHTHALMOLOGY

## 2023-10-10 PROCEDURE — 92202 OPSCPY EXTND ON/MAC DRAW: CPT | Mod: 59,PBBFAC | Performed by: OPHTHALMOLOGY

## 2023-10-10 PROCEDURE — 1160F RVW MEDS BY RX/DR IN RCRD: CPT | Mod: CPTII,,, | Performed by: OPHTHALMOLOGY

## 2023-10-10 PROCEDURE — 92202 OPSCPY EXTND ON/MAC DRAW: CPT | Mod: 59,S$PBB,, | Performed by: OPHTHALMOLOGY

## 2023-10-10 PROCEDURE — 92202 PR OPHTHALMOSCOPY, EXT, W/DRAW OPTIC NERVE/MACULA, I&R, UNI/BI: ICD-10-PCS | Mod: 59,S$PBB,, | Performed by: OPHTHALMOLOGY

## 2023-10-10 PROCEDURE — 92004 PR EYE EXAM, NEW PATIENT,COMPREHESV: ICD-10-PCS | Mod: S$PBB,,, | Performed by: OPHTHALMOLOGY

## 2023-10-10 PROCEDURE — 1159F PR MEDICATION LIST DOCUMENTED IN MEDICAL RECORD: ICD-10-PCS | Mod: CPTII,,, | Performed by: OPHTHALMOLOGY

## 2023-10-10 PROCEDURE — 99213 OFFICE O/P EST LOW 20 MIN: CPT | Mod: PBBFAC | Performed by: OPHTHALMOLOGY

## 2023-10-10 PROCEDURE — 1160F PR REVIEW ALL MEDS BY PRESCRIBER/CLIN PHARMACIST DOCUMENTED: ICD-10-PCS | Mod: CPTII,,, | Performed by: OPHTHALMOLOGY

## 2023-10-10 PROCEDURE — 92134 OCT, RETINA - OU - BOTH EYES: ICD-10-PCS | Mod: 26,S$PBB,, | Performed by: OPHTHALMOLOGY

## 2023-10-10 PROCEDURE — 99999 PR PBB SHADOW E&M-EST. PATIENT-LVL III: CPT | Mod: PBBFAC,,, | Performed by: OPHTHALMOLOGY

## 2023-10-10 PROCEDURE — 92134 CPTRZ OPH DX IMG PST SGM RTA: CPT | Mod: PBBFAC | Performed by: OPHTHALMOLOGY

## 2023-10-10 NOTE — PROGRESS NOTES
HPI    ret eval DFE/ MOCTOU/ floaters OU    CC: pt states :  I have a h/o seizures ( that occurs at anytime)    Medical H/O   -h/o epilepsy    -h/o seizures and only occurs at night when sleeping (onset 2018/-2019)    ++Eye pain ( 10+ at times)   ++blurred Va w/bright lights)  -diplopia  --headaches  ++floaters/w/o flashes  --curtains/shadows/veils    Eye Meds : NONE       Last edited by Brittni Wharton MA on 10/10/2023 10:02 AM.         A/P    ICD-10-CM ICD-9-CM   1. Vitreomacular adhesion of both eyes  H43.823 379.27   2. Complex partial seizures evolving to generalized tonic-clonic seizures  G40.209 345.40   3. Dry eye syndrome of both eyes  H04.123 375.15       1. Vitreomacular adhesion of both eyes  Here for floater eval  Very photophobic - triggers seizures  Has VMA OU, no RT/RD on DFE  Plan: Observation  Pathology of PVD, Retinal Tear, Retinal Detachment reviewed in great detail  RD precautions discussed in detail, patient expressed understanding  RTC immediately PRN (especially ANY change flashes, floaters, vision, visual field)     2. Complex partial seizures evolving to generalized tonic-clonic seizures  Hx of seizures, most recent few weeks ago  F/b Dr. Neurology - Dr. Kellogg last seen in June 2023 - notes reviewed   Has appt coming up 10/26/23  Plan: Observation for now, continue seizure meds and f/u with Neurology, will refer to Neuro-ophtho per pt request to be seen for her photophobia triggering seizures    3. Dry eye syndrome of both eyes  Mild dry eye  Rec ATs prn    RTC Neuro-ophtho for epilepsy/eye pain eval   RTC Segundo prn        I saw and examined the patient and reviewed in detail the findings documented. The final examination findings, image interpretations which have been independently interpreted, and plan as documented in the record represent my personal judgment and conclusions.    Ed Raymond MD  Vitreoretinal Surgery   [facility]

## 2023-10-26 ENCOUNTER — OFFICE VISIT (OUTPATIENT)
Dept: NEUROLOGY | Facility: CLINIC | Age: 33
End: 2023-10-26
Payer: MEDICAID

## 2023-10-26 VITALS
DIASTOLIC BLOOD PRESSURE: 79 MMHG | HEART RATE: 72 BPM | BODY MASS INDEX: 27.38 KG/M2 | HEIGHT: 61 IN | SYSTOLIC BLOOD PRESSURE: 115 MMHG | WEIGHT: 145 LBS

## 2023-10-26 DIAGNOSIS — R41.3 MEMORY DISTURBANCE: ICD-10-CM

## 2023-10-26 DIAGNOSIS — G40.219 LOCALIZATION-RELATED (FOCAL) (PARTIAL) SYMPTOMATIC EPILEPSY AND EPILEPTIC SYNDROMES WITH COMPLEX PARTIAL SEIZURES, INTRACTABLE, WITHOUT STATUS EPILEPTICUS: ICD-10-CM

## 2023-10-26 DIAGNOSIS — G43.011 INTRACTABLE MIGRAINE WITHOUT AURA AND WITH STATUS MIGRAINOSUS: ICD-10-CM

## 2023-10-26 DIAGNOSIS — G40.219 COMPLEX PARTIAL EPILEPSY WITH GENERALIZATION AND WITH INTRACTABLE EPILEPSY: Primary | ICD-10-CM

## 2023-10-26 PROCEDURE — 99215 PR OFFICE/OUTPT VISIT, EST, LEVL V, 40-54 MIN: ICD-10-PCS | Mod: S$PBB,,,

## 2023-10-26 PROCEDURE — 99999 PR PBB SHADOW E&M-EST. PATIENT-LVL IV: ICD-10-PCS | Mod: PBBFAC,,,

## 2023-10-26 PROCEDURE — 99214 OFFICE O/P EST MOD 30 MIN: CPT | Mod: PBBFAC

## 2023-10-26 PROCEDURE — 99999 PR PBB SHADOW E&M-EST. PATIENT-LVL IV: CPT | Mod: PBBFAC,,,

## 2023-10-26 PROCEDURE — 3008F PR BODY MASS INDEX (BMI) DOCUMENTED: ICD-10-PCS | Mod: CPTII,,,

## 2023-10-26 PROCEDURE — 3074F PR MOST RECENT SYSTOLIC BLOOD PRESSURE < 130 MM HG: ICD-10-PCS | Mod: CPTII,,,

## 2023-10-26 PROCEDURE — 99215 OFFICE O/P EST HI 40 MIN: CPT | Mod: S$PBB,,,

## 2023-10-26 PROCEDURE — 1159F PR MEDICATION LIST DOCUMENTED IN MEDICAL RECORD: ICD-10-PCS | Mod: CPTII,,,

## 2023-10-26 PROCEDURE — 3008F BODY MASS INDEX DOCD: CPT | Mod: CPTII,,,

## 2023-10-26 PROCEDURE — 1159F MED LIST DOCD IN RCRD: CPT | Mod: CPTII,,,

## 2023-10-26 PROCEDURE — 3078F PR MOST RECENT DIASTOLIC BLOOD PRESSURE < 80 MM HG: ICD-10-PCS | Mod: CPTII,,,

## 2023-10-26 PROCEDURE — 3078F DIAST BP <80 MM HG: CPT | Mod: CPTII,,,

## 2023-10-26 PROCEDURE — 3074F SYST BP LT 130 MM HG: CPT | Mod: CPTII,,,

## 2023-10-26 NOTE — PATIENT INSTRUCTIONS
You came to Ep[facility]psy [facility] because of your seizure disorder. Your seizures are mostly controlled on lamotrigine 300mg nightly. Please continue the same medication at the same dose.     There is a medication called zonisamide that is helpful for both headaches and seizures. Please take 1 capsule nightly for 1 week, if you are tolerating, then increase to 2 capsules nightly. You do have to take this medication every night for it to help with your headaches and seizures. You can take it with the lamotrigine. Potential side effects include drowsiness, decreased appetite/weight loss, possibly tingling in your fingertips. This medication also can increase your risk of kidney stones so please drink plenty of water when taking this medication. If you develop a rash or sudden eye pain, please stop the medication and let me know.     Our epilepsy  Kash will reach out to you over the portal to schedule a virtual visit to discuss disability and help connect you to a disability .     I have placed a referral for an evaluation by our neurocognitive team. Please call [phone] during normal working hours and ask for Lise Vega in order to schedule this.     Do not miss any doses of medication. If a dose of medication is missed, take it as soon as it is remembered even if that means doubling up on the dose. Take folic acid 1mg daily. Get regular sleep. Go to sleep at the same time and wake up at the same time every day. People with epilepsy require more sleep than people without epilepsy.  Sleeping 10-12 hours a day can be normal for a person with epilepsy.  Every seizure makes it harder to prevent the next seizure. Epilepsy is associated with SUDEP, or sudden unexpected death in epilepsy.  The risk is significantly higher if convulsive seizures are not well controlled. For more information, check out these websites: https://www.epilepsy.com/, https://www.epilepsyalliance[place].org/,  www.lucio-epilepsy.org, www.womenandepilepsy.org.  If you are interested in meeting other individuals in our epilepsy community, please reach out to the [facility] [place] ([phone], [phone], info@ep[place]ps[place].org).  They organize many informative and fun activities in the region.  They can provide you invaluable information on how to get access to resources available for patients living with epilepsy as well as a rich community of like-minded individuals who are all learning to cope with the same issues.  It is very important to remember, you are not alone.     Per [place] law, no episodes of loss of consciousness for 6 months before driving.  Avoid dangerous situations.  For example, no baths/pools alone, no heights, no power tools.  Wear a bike helmet.  If breakthrough seizures occur that are different in character, frequency, or duration from normal episodes, please patient portal me or call the office and we will decide the next steps. If multiple seizures occur in a row without return back to baseline, 911 needs to be called.     Return to clinic in 3-6 months or sooner with issues.  Please patient portal with any questions or concerns.    Lissy Box PA-C   Neurology-Epilepsy  [facility]    Forms/Letters/Disability/DMV Paperwork: We understand the importance of filling out forms and providing letters in a timely manner.  However, many of these forms have very tricky language and once an official form is submitted as part of the medical record, it can not be modified or erased.  Please work with us in order to get these forms filled out in the most complete, accurate, and efficient way. 1.  Once you are aware that a form will need to be completed, please make an appointment.  A virtual appointment with Dr. Gonzalez or Catalina is perfectly fine. 2.  Please fill out the form as much as you can.  There are many questions that we do not have an answer for.  Please bring a  blank copy of the form and your partially filled out form to the clinic visit or send them to us over the portal.  We will complete the form together with you during the clinic visit, sign it, and either return it to you or send it to the correct destination.  Every form will require an appointment however we can fill out multiple forms at once if needed.  Please do not hesitate to reach out with any questions or concerns about this policy.  We are trying to make sure that we have a system in place to meet this need which works for everyone involved.  Thank you for your understanding.
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
## 2023-10-31 RX ORDER — LAMOTRIGINE 300 MG/1
300 TABLET, EXTENDED RELEASE ORAL DAILY
Qty: 90 TABLET | Refills: 3 | Status: SHIPPED | OUTPATIENT
Start: 2023-10-31 | End: 2024-10-30

## 2023-10-31 RX ORDER — ZONISAMIDE 100 MG/1
200 CAPSULE ORAL NIGHTLY
Qty: 180 CAPSULE | Refills: 3 | Status: SHIPPED | OUTPATIENT
Start: 2023-10-31 | End: 2024-01-22

## 2023-11-17 ENCOUNTER — PATIENT MESSAGE (OUTPATIENT)
Dept: NEUROLOGY | Facility: CLINIC | Age: 33
End: 2023-11-17
Payer: MEDICAID

## 2023-11-28 ENCOUNTER — TELEPHONE (OUTPATIENT)
Dept: NEUROLOGY | Facility: CLINIC | Age: 33
End: 2023-11-28
Payer: MEDICAID

## 2023-11-28 DIAGNOSIS — G43.011 INTRACTABLE MIGRAINE WITHOUT AURA AND WITH STATUS MIGRAINOSUS: Primary | ICD-10-CM

## 2023-11-28 RX ORDER — IBUPROFEN 800 MG/1
TABLET ORAL
Qty: 20 TABLET | Refills: 2 | Status: SHIPPED | OUTPATIENT
Start: 2023-11-28

## 2023-11-28 NOTE — TELEPHONE ENCOUNTER
----- Message from Jitendra Rubio MA sent at 11/28/2023 12:58 PM CST -----  Regarding: RE: refill  Contact: [phone]  Spoke with patient and tried to schedule but no appt available for the remaining of the year. Patient is hoping for a call from JENNIFER.  ----- Message -----  From: Maddy Kellogg PA-C  Sent: 11/28/2023   9:13 AM CST  To: Lissy Box PA-C; Jitendra Rubio MA  Subject: RE: refill                                       gunnar Ham send her a msg and let her know that if she wishes to discuss other as needed options fo rher ha's, we can schedule a f/up with me. Last visit, I know she was pretty adamant on not trying other options for her ha's, so she may not take us up on the ojffer.   Thanks!  ----- Message -----  From: Jitendra Rubio MA  Sent: 11/27/2023   3:00 PM CST  To: Maddy Kellogg PA-C; Lissy Box PA-C  Subject: FW: refill                                       Please advise.    Including Catalina here due to patient having seizure episode last night + headaches  ----- Message -----  From: Brittany Garza  Sent: 11/27/2023   2:58 PM CST  To: aVldez Castañeda Staff  Subject: refill                                           Who Called: Ifrah    Refill or New Rx:refill    RX Name and Strength:ibuprofen (ADVIL,MOTRIN) 800 MG tablet    Preferred Pharmacy with phone number:[id number] - [facility] [address]    Phone: [phone] Fax:[phone]  Would the patient rather a call back or a response via [facility]? Call back    Best Call Back Number:[phone]    Additional Information: pt stated she had a seizure last night and tried taking Tylenol but it hasn't help with the headache pt stated she has a upcoming appt on 12/1/23

## 2023-11-28 NOTE — TELEPHONE ENCOUNTER
Called pt per her request. Pt states she is asking for a refill of ibuprofen 800mg. Does not take it frequently. Her 3 month supply lasted 5 months. States she takes it only when her ha is bad. Works well. Will refill and advised pt to f/up by June 2024.

## 2023-11-29 ENCOUNTER — TELEPHONE (OUTPATIENT)
Dept: NEUROLOGY | Facility: CLINIC | Age: 33
End: 2023-11-29
Payer: MEDICAID

## 2023-11-29 NOTE — TELEPHONE ENCOUNTER
----- Message from Lissy Box PA-C sent at 11/28/2023  8:17 PM CST -----  Regarding: RE: refill  Contact: [phone]  Thanks for keeping me in the loop. This patient often has seizures when she does not take her medications so I suspect that is what may have happened.  You can let her know she is always welcome to reach out to me over the portal with any concerns.  Thanks again!    Catalina  ----- Message -----  From: Maddy Kellogg PA-C  Sent: 11/28/2023   9:13 AM CST  To: Lissy Box PA-C; Jitendra Rubio MA  Subject: RE: refill                                       gunnar Ham send her a msg and let her know that if she wishes to discuss other as needed options fo rher ha's, we can schedule a f/up with me. Last visit, I know she was pretty adamant on not trying other options for her ha's, so she may not take us up on the ojffer.   Thanks!  ----- Message -----  From: Jitendra Rubio MA  Sent: 11/27/2023   3:00 PM CST  To: Maddy Kellogg PA-C; Lissy Box PA-C  Subject: FW: refill                                       Please advise.    Including Catalina here due to patient having seizure episode last night + headaches  ----- Message -----  From: Brittany Garza  Sent: 11/27/2023   2:58 PM CST  To: Valdez Castañeda Staff  Subject: refill                                           Who Called: Ifrah    Refill or New Rx:refill    RX Name and Strength:ibuprofen (ADVIL,MOTRIN) 800 MG tablet    Preferred Pharmacy with phone number:[id number] - [facility] [address] AT [address]    Phone: [phone] Fax:[phone]  Would the patient rather a call back or a response via [facility]? Call back    Best Call Back Number:[phone]    Additional Information: pt stated she had a seizure last night and tried taking Tylenol but it hasn't help with the headache pt stated she has a upcoming appt on 12/1/23

## 2023-11-29 NOTE — TELEPHONE ENCOUNTER
Spoke with patient and informed that patient needs to have an appointment. Patient will be scheduled virtually on 12/01/23 at 11am

## 2023-12-01 ENCOUNTER — OFFICE VISIT (OUTPATIENT)
Dept: NEUROLOGY | Facility: CLINIC | Age: 33
End: 2023-12-01
Payer: MEDICAID

## 2023-12-01 DIAGNOSIS — G43.109 MIGRAINE WITH AURA AND WITHOUT STATUS MIGRAINOSUS, NOT INTRACTABLE: Primary | ICD-10-CM

## 2023-12-01 PROCEDURE — 1159F MED LIST DOCD IN RCRD: CPT | Mod: CPTII,95,, | Performed by: PHYSICIAN ASSISTANT

## 2023-12-01 PROCEDURE — 99214 PR OFFICE/OUTPT VISIT, EST, LEVL IV, 30-39 MIN: ICD-10-PCS | Mod: 95,,, | Performed by: PHYSICIAN ASSISTANT

## 2023-12-01 PROCEDURE — 1159F PR MEDICATION LIST DOCUMENTED IN MEDICAL RECORD: ICD-10-PCS | Mod: CPTII,95,, | Performed by: PHYSICIAN ASSISTANT

## 2023-12-01 PROCEDURE — 99214 OFFICE O/P EST MOD 30 MIN: CPT | Mod: 95,,, | Performed by: PHYSICIAN ASSISTANT

## 2023-12-01 PROCEDURE — 1160F PR REVIEW ALL MEDS BY PRESCRIBER/CLIN PHARMACIST DOCUMENTED: ICD-10-PCS | Mod: CPTII,95,, | Performed by: PHYSICIAN ASSISTANT

## 2023-12-01 PROCEDURE — 1160F RVW MEDS BY RX/DR IN RCRD: CPT | Mod: CPTII,95,, | Performed by: PHYSICIAN ASSISTANT

## 2023-12-01 NOTE — PROGRESS NOTES
Established Patient     The patient location is: [place]  The chief complaint leading to consultation is: headache follow up visit    Visit type: audiovisual    Face to Face time with patient: 3 min  8 minutes of total time spent on the encounter, which includes face to face time and non-face to face time preparing to see the patient (eg, review of tests), Obtaining and/or reviewing separately obtained history, Documenting clinical information in the electronic or other health record, Independently interpreting results (not separately reported) and communicating results to the patient/family/caregiver, or Care coordination (not separately reported).     Each patient to whom he or she provides medical services by telemedicine is:  (1) informed of the relationship between the physician and patient and the respective role of any other health care provider with respect to management of the patient; and (2) notified that he or she may decline to receive medical services by telemedicine and may withdraw from such care at any time.    Notes:        SUBJECTIVE:  Patient ID: Ifrah Valentine   Chief Complaint: Headache    History of Present Illness:  Ifrah Valentine is a 33 y.o. female with migraines, seizures, anxiety, depression  who presents via virtual visit alone for follow-up of headaches.       12/01/2023 - Interval History:  Pt states ha's occur 3-4/30 days per month. Uses ibuprofen infrequently, does not exceed 3 days a week. Refills given. Will f/up prn.     Recommendations made at last Office Visit on 6/12/23:  Discussed symptoms appear to be consistent with migraines. Discussed this with patient. Discussed multiple options w/ pt including daily oral ppx's, increasing zonisamide, cgrp inhibitors, botox, gepants, and steroid taper. Pt defers these options and verbalizes a wish to focus on non-medication options. Discussed non-medication options such as vitamins, trigger mgmt, and neuro-modulation devices. Pt agreeable to  these. Pt also states she wishes to only take ibuproefn 800mg prn for an abortive option. Counseled on risks including overuse and to limit to <2-3 days in a week, pt verbalizes her understanding.   Pt will try mag ox and trigger mgmt, she will also continue ibuprofen 800mg limited to <2-3 days in a week, and f/up prn.     Treatments Tried:  Sertraline  Lamictal  Keppra - se's  Zonisamide - no improvement in ha's  Ibuprofen 800mg - helps  Tylenol pm, excedrin, goodys    Current Medications:    Current Outpatient Medications:     (Magic mouthwash) 1:1:1 diphenhydrAMINE(Benadryl) 12.5mg/5ml liq, aluminum & magnesium hydroxide-simethicone (Maalox), LIDOcaine viscous 2%, Swish and spit 15 mLs every 4 (four) hours as needed (tongue laceration)., Disp: 500 mL, Rfl: 1    DRYSOL DAB-O-MATIC 20 % external solution, Apply topically nightly., Disp: , Rfl:     fluticasone propionate (FLONASE) 50 mcg/actuation nasal spray, 2 sprays (100 mcg total) by Each Nostril route daily as needed., Disp: 15 g, Rfl: 0    folic acid (FOLVITE) 1 MG tablet, TAKE 1 TABLET(1 MG) BY MOUTH EVERY DAY, Disp: 90 tablet, Rfl: 3    ibuprofen (ADVIL,MOTRIN) 800 MG tablet, Take 1 tablet (800 mg total) by mouth every 8 (eight) hours as needed (for headache). Do not take more than 3,200 mg in a day. Or more than 2-3 days in a week, Disp: 20 tablet, Rfl: 2    lamotrigine XR (LAMICTAL XR) 300 mg XR tablet, Take 1 tablet (300 mg total) by mouth once daily., Disp: 90 tablet, Rfl: 3    tiZANidine (ZANAFLEX) 2 MG tablet, Take 1 tablet (2 mg total) by mouth every 6 (six) hours as needed (1-2 tablets for muscle spasms)., Disp: 30 tablet, Rfl: 2    tretinoin (RETIN-A) 0.025 % cream, Apply topically nightly., Disp: , Rfl:     zonisamide (ZONEGRAN) 100 MG Cap, Take 2 capsules (200 mg total) by mouth every evening. (Start with 1 capsule nightly for one week. If tolerating, increase to 2 capsules nightly.), Disp: 180 capsule, Rfl: 3    Review of Systems - as per HPI,  otherwise a balanced 10 systems review is negative.    OBJECTIVE:  Vitals:  There were no vitals taken for this visit.     Physical Exam:  Constitutional: she appears well-developed and well-nourished. she is well groomed. NAD   HENT:    Head: Normocephalic and atraumatic  Eyes: Conjunctivae and EOM are normal  Musculoskeletal: Normal range of motion. No joint stiffness.   Psychiatric: Mood and affect are normal    Neuro: Patient is alert and oriented to person, place, and time. Language is intact and fluent. Speech is clear and fluent. Recent and remote memory are intact.  Normal attention and concentration.  Facial movement is symmetric. Moves all 4 extremities against gravity.      Review of Data:   Notes from neuro reviewed   Labs:  No visits with results within 3 Month(s) from this visit.   Latest known visit with results is:   Lab Visit on 10/11/2022   Component Date Value Ref Range Status    Lamotrigine Lvl 10/11/2022 4.9  2.0 - 15.0 ug/mL Final     Imaging:  Results for orders placed or performed during the hospital encounter of 08/14/21   CT Head Without Contrast    Narrative    EXAMINATION:  CT HEAD WITHOUT CONTRAST    CLINICAL HISTORY:  Headache, acute, normal neuro exam;    TECHNIQUE:  Low dose axial images were obtained through the head.  Coronal and sagittal reformations were also performed. Contrast was not administered.    COMPARISON:  None.    FINDINGS:  There is no acute intracranial hemorrhage, hydrocephalus, midline shift or mass effect. Gray-white matter differentiation appears maintained. The basal cisterns are patent. The mastoid air cells and paranasal sinuses are clear of acute process. The visualized bones of the calvarium demonstrate no acute osseous abnormality.      Impression    No CT evidence of acute intracranial abnormality. Clinical correlation and further evaluation as warranted.      Electronically signed by: Fly Guerra MD  Date:    08/14/2021  Time:    05:25     Note: I have  independently reviewed any/all imaging/labs/tests and agree with the report (s) as documented.  Any discrepancies will be as noted/demarcated by free text.  AUDELIA PETERS 12/1/2023    ASSESSMENT:  1. Migraine with aura and without status migrainosus, not intractable        PLAN:  - ha's well controlled  - abortive - continue infrequent ibuprofen <2-3 days a week  - Continue tracking headaches   - Discussed goals of therapy are to decrease the frequency, intensity, and duration of headaches  - RTC prn          Discussed potential for teratogenicity with treatment, patient understands if her family planning status should change she will contact office immediately and we will safely adjust medications as needed.     Questions and concerns were sought and answered to the patient's stated verbal satisfaction.  The patient verbalizes understanding and agreement with the above stated treatment plan.     CC: No, Primary Doctor      Maddy Kellogg PA-C  [facility]   [phone]    Dr. Bond was available during today's encounter.

## 2024-01-10 ENCOUNTER — TELEPHONE (OUTPATIENT)
Dept: NEUROLOGY | Facility: CLINIC | Age: 34
End: 2024-01-10
Payer: MEDICAID

## 2024-01-10 ENCOUNTER — PATIENT MESSAGE (OUTPATIENT)
Dept: NEUROLOGY | Facility: CLINIC | Age: 34
End: 2024-01-10
Payer: MEDICAID

## 2024-01-10 NOTE — TELEPHONE ENCOUNTER
Response sent via [employer] message.    Lissy Box PA-C   Neurology-Epilepsy  [facility]    ----- Message from Rachele Roberts MA sent at 1/5/2024  1:50 PM CST -----  Regarding: FW: Document  Contact: [phone]    ----- Message -----  From: Elaine Panda  Sent: 1/5/2024   1:26 PM CST  To: Isauro Yuan Staff  Subject: Document                                         Patient calling in regards to getting a document stating she was diagnosed with epilepsy for work. Patient asked that the letter be uploaded to her my chart. Please call patient to discuss as soon as possible.

## 2024-01-22 ENCOUNTER — OFFICE VISIT (OUTPATIENT)
Dept: NEUROLOGY | Facility: CLINIC | Age: 34
End: 2024-01-22
Payer: MEDICAID

## 2024-01-22 VITALS
BODY MASS INDEX: 27.47 KG/M2 | WEIGHT: 145.5 LBS | HEIGHT: 61 IN | SYSTOLIC BLOOD PRESSURE: 103 MMHG | DIASTOLIC BLOOD PRESSURE: 71 MMHG | HEART RATE: 91 BPM

## 2024-01-22 DIAGNOSIS — G43.109 MIGRAINE WITH AURA AND WITHOUT STATUS MIGRAINOSUS, NOT INTRACTABLE: ICD-10-CM

## 2024-01-22 DIAGNOSIS — F43.23 ADJUSTMENT DISORDER WITH MIXED ANXIETY AND DEPRESSED MOOD: ICD-10-CM

## 2024-01-22 DIAGNOSIS — M62.838 MUSCLE SPASMS OF NECK: ICD-10-CM

## 2024-01-22 DIAGNOSIS — G40.219 COMPLEX PARTIAL EPILEPSY WITH GENERALIZATION AND WITH INTRACTABLE EPILEPSY: Primary | ICD-10-CM

## 2024-01-22 PROCEDURE — 99214 OFFICE O/P EST MOD 30 MIN: CPT | Mod: PBBFAC

## 2024-01-22 PROCEDURE — 1159F MED LIST DOCD IN RCRD: CPT | Mod: CPTII,,,

## 2024-01-22 PROCEDURE — 3074F SYST BP LT 130 MM HG: CPT | Mod: CPTII,,,

## 2024-01-22 PROCEDURE — 3008F BODY MASS INDEX DOCD: CPT | Mod: CPTII,,,

## 2024-01-22 PROCEDURE — 3078F DIAST BP <80 MM HG: CPT | Mod: CPTII,,,

## 2024-01-22 PROCEDURE — 99215 OFFICE O/P EST HI 40 MIN: CPT | Mod: S$PBB,,,

## 2024-01-22 PROCEDURE — 99999 PR PBB SHADOW E&M-EST. PATIENT-LVL IV: CPT | Mod: PBBFAC,,,

## 2024-01-22 RX ORDER — MIDAZOLAM 5 MG/.1ML
1 SPRAY NASAL
Qty: 2 EACH | Refills: 2 | Status: SHIPPED | OUTPATIENT
Start: 2024-01-22
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
## 2024-01-22 NOTE — PATIENT INSTRUCTIONS
You came to [facility] because of your seizure disorder. Your seizures are well controlled on lamotrigine 300mg nightly. Please continue the same medication at the same dose.     Do not miss any doses of medication. If a dose of medication is missed, take it as soon as it is remembered even if that means doubling up on the dose. Please get a lab test to check out the blood level of medication. Take folic acid 1mg daily.     Seizure watch:  Please visit [employer]   Order the Embrace2 Seizure Watch, Quantity #1   You will need to upload this prescription to that website    I have also sent in a prescription for nayzilam spray to be used in the event of a seizure cluster or prolonged seizure. Administer one spray in one nostril as needed for rescue. After 10 minutes if seizure activity continues, an additional spray can be administered in the other nostril. If patient does not return to baseline, 911 needs to be called. It is recommended that you do not use this medication to treat more than one episode every three days, and no more than five episodes per month.     I placed an order for physical therapy to help with headaches and muscle tightness. They will call you to schedule.     Reach out to our  Kash over the patient portal if you would like some extra help/resources regarding the disability process.     Get regular sleep. Go to sleep at the same time and wake up at the same time every day. People with epilepsy require more sleep than people without epilepsy.  Sleeping 10-12 hours a day can be normal for a person with epilepsy.  Every seizure makes it harder to prevent the next seizure. Epilepsy is associated with SUDEP, or sudden unexpected death in epilepsy.  The risk is significantly higher if convulsive seizures are not well controlled. For more information, check out these websites: https://www.epilepsy.com/, https://www.epilepsyallianceamerica.org/, www.lucio-epilepsy.org,  www.womenandepilepsy.org.  If you are interested in meeting other individuals in our epilepsy community, please reach out to the [facility] [place] ([phone], [phone], [email or web address]@[email or web address].[email or web address]).  They organize many informative and fun activities in the region.  They can provide you invaluable information on how to get access to resources available for patients living with epilepsy as well as a rich community of like-minded individuals who are all learning to cope with the same issues.  It is very important to remember, you are not alone.     Per [place] law, no episodes of loss of consciousness for 6 months before driving.  Avoid dangerous situations.  For example, no baths/pools alone, no heights, no power tools.  Wear a bike helmet.  If breakthrough seizures occur that are different in character, frequency, or duration from normal episodes, please patient portal me or call the office and we will decide the next steps. If multiple seizures occur in a row without return back to baseline, 911 needs to be called.     Return to clinic in 6 months or sooner with issues. Please patient portal with any questions or concerns.    Lissy Box PA-C   Neurology-Epilepsy  [facility]    Forms/Letters/Disability/DMV Paperwork: We understand the importance of filling out forms and providing letters in a timely manner.  However, many of these forms have very tricky language and once an official form is submitted as part of the medical record, it can not be modified or erased.  Please work with us in order to get these forms filled out in the most complete, accurate, and efficient way. 1.  Once you are aware that a form will need to be completed, please make an appointment.  A virtual appointment with Dr. Gonzalez or Catalina is perfectly fine. 2.  Please fill out the form as much as you can.  There are many questions that we do not have an answer for.  Please bring a blank copy of the form  and your partially filled out form to the clinic visit or send them to us over the portal.  We will complete the form together with you during the clinic visit, sign it, and either return it to you or send it to the correct destination.  Every form will require an appointment however we can fill out multiple forms at once if needed.  Please do not hesitate to reach out with any questions or concerns about this policy.  We are trying to make sure that we have a system in place to meet this need which works for everyone involved.  Thank you for your understanding.

## 2024-02-18 ENCOUNTER — NURSE TRIAGE (OUTPATIENT)
Dept: ADMINISTRATIVE | Facility: CLINIC | Age: 34
End: 2024-02-18
Payer: MEDICAID

## 2024-02-18 NOTE — TELEPHONE ENCOUNTER
Pt had a seizure this morning around 4am. History of epilepsy. Did not take her med last night and thinks it's why she had a seizure. Requesting refill of magic mouth wash because she bit her tongue during seizure.     Dispo-  when office is open- non essential medication. Pt frustrated and ended call before care advice could be given. Routing message to providers office.  Reason for Disposition   [1] Prescription refill request for NON-ESSENTIAL medicine (i.e., no harm to patient if med not taken) AND [2] triager unable to refill per department policy    Protocols used: Medication Refill and Renewal Call-A-AH

## 2024-02-19 ENCOUNTER — TELEPHONE (OUTPATIENT)
Dept: NEUROLOGY | Facility: CLINIC | Age: 34
End: 2024-02-19
Payer: MEDICAID

## 2024-02-19 DIAGNOSIS — G40.219 COMPLEX PARTIAL EPILEPSY WITH GENERALIZATION AND WITH INTRACTABLE EPILEPSY: ICD-10-CM

## 2024-02-19 DIAGNOSIS — G40.919 BREAKTHROUGH SEIZURE: ICD-10-CM

## 2024-02-19 NOTE — TELEPHONE ENCOUNTER
Magic mouthwash as needed for tongue lacerations from seizures    Kim Gonzalez MD [facility]  Neurology-Epilepsy  [facility].

## 2024-02-19 NOTE — TELEPHONE ENCOUNTER
Magic mouthwash refilled in a different encounter on the same day    Kim Gonzalez MD PhD FACNS  Neurology-Epilepsy  [facility].        ----- Message from Brittany Garza sent at 2/19/2024  8:29 AM CST -----  Regarding: refill  Contact: [phone]  Who Called: Ayonna    Refill or New Rx:refill    RX Name and Strength:(Magic mouthwash) 1:1:1 diphenhydrAMINE(Benadryl) 12.5mg/5ml liq, aluminum & magnesium hydroxide-simethicone (Maalox), LIDOcaine viscous 2%    Preferred Pharmacy with phone number:[id number] - [place] [address]    Phone: [phone] Fax:[phone]  Would the patient rather a call back or a response via [facility]? Call back    Best Call Back Number: [phone]    Additional Information:           Preferred pharmacy verified and updated.    Pt advised to check with pharmacy first before picking up prescriptions.      Pt advised their refill will be addressed within 24-48 hrs.    Please call patient back if any questions, comments or concerns.    Telephone:  [phone]      Dad is requesting 2 inhalers one for school

## 2024-02-20 NOTE — TELEPHONE ENCOUNTER
Refill sent today by Dr. Kim Gonzalez.    Lissy Box PA-C   Neurology-Epilepsy  [facility]

## 2024-02-27 ENCOUNTER — E-VISIT (OUTPATIENT)
Dept: FAMILY MEDICINE | Facility: CLINIC | Age: 34
End: 2024-02-27
Payer: MEDICAID

## 2024-02-27 ENCOUNTER — TELEPHONE (OUTPATIENT)
Dept: FAMILY MEDICINE | Facility: CLINIC | Age: 34
End: 2024-02-27

## 2024-02-27 DIAGNOSIS — G40.209 COMPLEX PARTIAL SEIZURES EVOLVING TO GENERALIZED TONIC-CLONIC SEIZURES: Primary | ICD-10-CM

## 2024-02-27 PROCEDURE — 99499 UNLISTED E&M SERVICE: CPT | Mod: 95,,, | Performed by: STUDENT IN AN ORGANIZED HEALTH CARE EDUCATION/TRAINING PROGRAM

## 2024-02-27 NOTE — PROGRESS NOTES
Will cancel evisit which is not appropriate.  Recommended for patient to make an in-person visit and my staff will call the patient

## 2024-02-27 NOTE — TELEPHONE ENCOUNTER
----- Message from Dre Toribio MD sent at 2/27/2024  7:31 AM CST -----  Regarding: cancel evisit please tell pt to be seen in person  Patient reported that she was having seizures recommend that if she is having current seizure recommend she go to the ER and he has had past seizures in his feeling well today recommend in-person visit with a provider

## 2024-02-27 NOTE — TELEPHONE ENCOUNTER
Pt notified; pt asked to contact her insurance provider to find out who accepts her insurance; pt verbalized understanding

## 2024-02-28 ENCOUNTER — TELEPHONE (OUTPATIENT)
Dept: NEUROLOGY | Facility: CLINIC | Age: 34
End: 2024-02-28
Payer: MEDICAID

## 2024-02-28 ENCOUNTER — CLINICAL SUPPORT (OUTPATIENT)
Dept: REHABILITATION | Facility: OTHER | Age: 34
End: 2024-02-28
Payer: MEDICAID

## 2024-02-28 DIAGNOSIS — R68.89 DECREASED STRENGTH, ENDURANCE, AND MOBILITY: ICD-10-CM

## 2024-02-28 DIAGNOSIS — M25.519 NECK AND SHOULDER PAIN: ICD-10-CM

## 2024-02-28 DIAGNOSIS — R51.9 HEADACHE, CHRONIC DAILY: Primary | ICD-10-CM

## 2024-02-28 DIAGNOSIS — R53.1 DECREASED STRENGTH, ENDURANCE, AND MOBILITY: ICD-10-CM

## 2024-02-28 DIAGNOSIS — R29.3 POSTURE IMBALANCE: ICD-10-CM

## 2024-02-28 DIAGNOSIS — M54.2 NECK AND SHOULDER PAIN: ICD-10-CM

## 2024-02-28 DIAGNOSIS — Z74.09 DECREASED STRENGTH, ENDURANCE, AND MOBILITY: ICD-10-CM

## 2024-02-28 PROCEDURE — 97163 PT EVAL HIGH COMPLEX 45 MIN: CPT | Mod: PN

## 2024-03-01 ENCOUNTER — DOCUMENTATION ONLY (OUTPATIENT)
Dept: REHABILITATION | Facility: OTHER | Age: 34
End: 2024-03-01
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
## 2024-03-01 NOTE — PROGRESS NOTES
Patient was scheduled for a physical therapy treatment appointment at [facility] on 3/1/2024. Patient failed to appear for the appointment without prior notification today.     Grant Mcleod III, PTA

## 2024-03-06 ENCOUNTER — DOCUMENTATION ONLY (OUTPATIENT)
Dept: REHABILITATION | Facility: OTHER | Age: 34
End: 2024-03-06
Payer: MEDICAID

## 2024-03-06 DIAGNOSIS — R68.89 DECREASED STRENGTH, ENDURANCE, AND MOBILITY: ICD-10-CM

## 2024-03-06 DIAGNOSIS — Z74.09 DECREASED STRENGTH, ENDURANCE, AND MOBILITY: ICD-10-CM

## 2024-03-06 DIAGNOSIS — R53.1 DECREASED STRENGTH, ENDURANCE, AND MOBILITY: ICD-10-CM

## 2024-03-06 DIAGNOSIS — R51.9 HEADACHE, CHRONIC DAILY: Primary | ICD-10-CM

## 2024-03-06 DIAGNOSIS — M54.2 NECK AND SHOULDER PAIN: ICD-10-CM

## 2024-03-06 DIAGNOSIS — M25.519 NECK AND SHOULDER PAIN: ICD-10-CM

## 2024-03-06 DIAGNOSIS — R29.3 POSTURE IMBALANCE: ICD-10-CM

## 2024-03-06 NOTE — PROGRESS NOTES
Physical Therapy No Show Note       Patient was scheduled for physical therapy at [facility] for 3/6/2024. Pt failed to appear for appointment without prior notification for today.     Katie Paniagua, PTA

## 2024-03-07 ENCOUNTER — PATIENT MESSAGE (OUTPATIENT)
Dept: REHABILITATION | Facility: OTHER | Age: 34
End: 2024-03-07
Payer: MEDICAID

## 2024-03-11 ENCOUNTER — PATIENT MESSAGE (OUTPATIENT)
Dept: REHABILITATION | Facility: OTHER | Age: 34
End: 2024-03-11
Payer: MEDICAID

## 2024-04-23 NOTE — PLAN OF CARE
02/06/20 1304   Post-Acute Status   Post-Acute Authorization Other   Other Status No Post-Acute Service Needs   Discharge Delays None known at this time   Discharge Plan   Discharge Plan A Home   Discharge Plan B Home     Kathya Molina RN  Case Management  Ext: [phone]  02/06/2020  1:04 PM       None

## 2024-05-20 ENCOUNTER — OFFICE VISIT (OUTPATIENT)
Dept: NEUROLOGY | Facility: CLINIC | Age: 34
End: 2024-05-20
Payer: MEDICAID

## 2024-05-20 VITALS
SYSTOLIC BLOOD PRESSURE: 103 MMHG | HEIGHT: 61 IN | BODY MASS INDEX: 27.77 KG/M2 | WEIGHT: 147.06 LBS | DIASTOLIC BLOOD PRESSURE: 78 MMHG

## 2024-05-20 DIAGNOSIS — R51.9 CHRONIC NONINTRACTABLE HEADACHE, UNSPECIFIED HEADACHE TYPE: ICD-10-CM

## 2024-05-20 DIAGNOSIS — G40.219 COMPLEX PARTIAL EPILEPSY WITH GENERALIZATION AND WITH INTRACTABLE EPILEPSY: Primary | ICD-10-CM

## 2024-05-20 DIAGNOSIS — G43.109 MIGRAINE WITH AURA AND WITHOUT STATUS MIGRAINOSUS, NOT INTRACTABLE: ICD-10-CM

## 2024-05-20 DIAGNOSIS — M62.838 MUSCLE SPASMS OF NECK: ICD-10-CM

## 2024-05-20 DIAGNOSIS — F43.23 ADJUSTMENT DISORDER WITH MIXED ANXIETY AND DEPRESSED MOOD: ICD-10-CM

## 2024-05-20 DIAGNOSIS — G89.29 CHRONIC NONINTRACTABLE HEADACHE, UNSPECIFIED HEADACHE TYPE: ICD-10-CM

## 2024-05-20 PROCEDURE — 3008F BODY MASS INDEX DOCD: CPT | Mod: CPTII,,,

## 2024-05-20 PROCEDURE — 99214 OFFICE O/P EST MOD 30 MIN: CPT | Mod: S$PBB,FS,,

## 2024-05-20 PROCEDURE — 3074F SYST BP LT 130 MM HG: CPT | Mod: CPTII,,,

## 2024-05-20 PROCEDURE — 99999 PR PBB SHADOW E&M-EST. PATIENT-LVL III: CPT | Mod: PBBFAC,,,

## 2024-05-20 PROCEDURE — 3078F DIAST BP <80 MM HG: CPT | Mod: CPTII,,,

## 2024-05-20 PROCEDURE — 99213 OFFICE O/P EST LOW 20 MIN: CPT | Mod: PBBFAC

## 2024-05-20 PROCEDURE — 1159F MED LIST DOCD IN RCRD: CPT | Mod: CPTII,,,

## 2024-05-20 NOTE — PATIENT INSTRUCTIONS
You came to Epilepsy Clinic because of your seizure disorder. Please continue the same medication at the same dose.     I placed another referral to physical therapy. When they call you to schedule, just let them know you prefer a [place] location.     I will also ask our , Kash, to reach out to you to help with your disability questions. He will send you a message over the portal.     Do not miss any doses of medication. If a dose of medication is missed, take it as soon as it is remembered even if that means doubling up on the dose. Please get a lab test to check out the blood level of medication.     Take folic acid 1mg daily. Get regular sleep. Go to sleep at the same time and wake up at the same time every day. People with epilepsy require more sleep than people without epilepsy.  Sleeping 10-12 hours a day can be normal for a person with epilepsy.  Every seizure makes it harder to prevent the next seizure. Epilepsy is associated with SUDEP, or sudden unexpected death in epilepsy.  The risk is significantly higher if convulsive seizures are not well controlled. For more information, check out these websites: https://www.epilepsy.com/, https://www.epilepsyallianceamerica.org/, www.lucio-epilepsy.org, www.womenandepilepsy.org.  If you are interested in meeting other individuals in our epilepsy community, please reach out to the [facility]ile[facility] [place] ([phone], [phone], info@[email or web address]).  They organize many informative and fun activities in the region.  They can provide you invaluable information on how to get access to resources available for patients living with epilepsy as well as a rich community of like-minded individuals who are all learning to cope with the same issues.  It is very important to remember, you are not alone.     Per [place] law, no episodes of loss of consciousness for 6 months before driving.  Avoid dangerous situations.  For example, no baths/pools  alone, no heights, no power tools.  Wear a bike helmet.  If breakthrough seizures occur that are different in character, frequency, or duration from normal episodes, please patient portal me or call the office and we will decide the next steps. If multiple seizures occur in a row without return back to baseline, 911 needs to be called.     Return to clinic in 3 months or sooner with issues.  Please patient portal with any questions or concerns.    Lissy Box PA-C   Neurology-Epilepsy  [facility]    Forms/Letters/Disability/DMV Paperwork: We understand the importance of filling out forms and providing letters in a timely manner.  However, many of these forms have very tricky language and once an official form is submitted as part of the medical record, it can not be modified or erased.  Please work with us in order to get these forms filled out in the most complete, accurate, and efficient way. 1.  Once you are aware that a form will need to be completed, please make an appointment.  A virtual appointment with Dr. Gonzalez or Catalina is perfectly fine. 2.  Please fill out the form as much as you can.  There are many questions that we do not have an answer for.  Please bring a blank copy of the form and your partially filled out form to the clinic visit or send them to us over the portal.  We will complete the form together with you during the clinic visit, sign it, and either return it to you or send it to the correct destination.  Every form will require an appointment however we can fill out multiple forms at once if needed.  Please do not hesitate to reach out with any questions or concerns about this policy.  We are trying to make sure that we have a system in place to meet this need which works for everyone involved.  Thank you for your understanding.
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
## 2024-05-20 NOTE — LETTER
May 20, 2024      [facility] [address]  [address]  [facility] [address]  Phone: [phone]  Fax: [phone]       Patient: Ifrah Valentine   YOB: 1990  Date of Visit: 05/20/2024    To Whom It May Concern:    Ifrah Valentine is under my care at [facility] for seizures/epilepsy. The patient still experiences intermittent breakthrough seizures despite treatment with medication. In the event of a seizure, the patient's mother, Madison Tao, is often required to provide care and monitoring to ensure Ms. Valentine' utmost safety. I ask that you please excuse any work absences related to caring for her daughter during times of exacerbation. I appreciate your understanding and assistance in caring for this patient. If you have any questions or concerns, or if I can be of further assistance, please do not hesitate to contact me.    Sincerely,      Lissy Box PA-C   Neurology-Epilepsy  [facility]

## 2024-06-21 ENCOUNTER — CLINICAL SUPPORT (OUTPATIENT)
Dept: REHABILITATION | Facility: HOSPITAL | Age: 34
End: 2024-06-21
Payer: MEDICAID

## 2024-06-21 DIAGNOSIS — G43.109 MIGRAINE WITH AURA AND WITHOUT STATUS MIGRAINOSUS, NOT INTRACTABLE: ICD-10-CM

## 2024-06-21 PROCEDURE — 97161 PT EVAL LOW COMPLEX 20 MIN: CPT | Mod: PO

## 2024-06-21 NOTE — PLAN OF CARE
[facility]   Physical Therapy Initial Evaluation      Name: Ifrah Valentine  Clinic Number: [id number]    Therapy Diagnosis:   Encounter Diagnosis   Name Primary?    Migraine with aura and without status migrainosus, not intractable         Physician: Lissy Box PA-C    Physician Orders: PT Eval and Treat   Medical Diagnosis from Referral:   G43.109 (ICD-10-CM) - Migraine with aura and without status migrainosus, not intractable     Evaluation Date: 6/21/2024  Authorization Period Expiration: 12/21/2024  Plan of Care Expiration: 9/21/2024  Progress Note Due: 7/21/2024  Date of Surgery: n/a  Visit # / Visits authorized: 1/ pending   FOTO: 1/ 3    Precautions: seizure     Time In: 1205 pm  Time Out: 1235  Total Billable Time: 30 minutes    Subjective     Date of onset: chronic condition - HA present since epilepsy diagnosis.     History of current condition - Ifrah reports: Ifrah reports: Insidious onset of right sided headaches and neck that started 1 year ago. Pt notes difficulty and pain in the head and neck with most ADLs, but says that following a seizure she is unable to do anything due to pain and feelings of tightness/weakness in her arms and legs. She does not endorse any neck pain and reports that she only has light sensitivity and reports that it causes her seizures and headaches. She gets dizziness at times as well with headaches. Her headaches start in the head and stay in the head and she has blurry vision when these headaches occur.     Falls: only when she has a seizure    Imaging: CT scan films: for head    Prior Therapy: 1 session at [facility]  Social History: [employer] with steps to enter, lives with daughter   Occupation: not working  Prior Level of Function: indep  Current Level of Function: pain and difficulty with functional activity, inc self care    Pain:  Current 0/10, worst 7/10, best 0/10   Location: right forehead and eye    Description: mouth burns, gets  sharp  Aggravating Factors: light sensitivity, forgetting medication  Easing Factors: pain medication and rest    Patients goals: reduce headaches, drive at night time     Medical History:   Past Medical History:   Diagnosis Date    Idiopathic generalized epilepsy     Onset April 2019       Surgical History:   Ifrah Valentine  has no past surgical history on file.    Medications:   Ifrah has a current medication list which includes the following prescription(s): diphenhydramine hcl, drysol dab-o-matic, ibuprofen, lamotrigine xr, nayzilam, and tretinoin.    Allergies:   Review of patient's allergies indicates:  No Known Allergies     Objective      Observation: amb indep into clinic without assist    Posture: WFL    Cervical Range of Motion:    Degrees   Flexion 45   Extension 55   Right Rotation 80   Left Rotation 80   Right Side Bend 30   Left Side Bend 30      Shoulder Range of Motion & Strength WFL:       Special Tests:  Distraction negative   Compression negative   Spurlings negative     Joint Mobility: min first rib elevation, otherwise no issues    Palpation: no tenderness to palpation throughout cervical paraspinals, upper traps and suboccipitals    Sensation: WFL    Flexibility: min tightness in pectoral muscles    Intake Outcome Measure for FOTO  Survey    Therapist reviewed FOTO scores for Ifrah Valentine on 6/21/2024.   FOTO report - see Media section or FOTO account episode details.    Intake Score: 52%         Treatment     Total Treatment time (time-based codes) separate from Evaluation: 00 minutes   Eval Only      Patient Education and Home Exercises     Education provided:   - educated on POC and home exercise program importance  -neuro vs Ortho PT   -cervicogenic HA vs post epileptic activity causing migraines    Written Home Exercises Provided: none given. Exercises were reviewed and Ifrah was able to demonstrate them prior to the end of the session.  Ifrah demonstrated good  understanding of the  education provided. See EMR under Patient Instructions for exercises provided during therapy sessions.    Assessment     Ifrah is a 34 y.o. female referred to outpatient Physical Therapy with a medical diagnosis of   G43.109 (ICD-10-CM) - Migraine with aura and without status migrainosus, not intractable   . Patient presents with normal Cervical range of motion, strength and soft tissue mobility. She did not present with any orthopedic needs at this time and referral was sent to neuro team in order to address migraines with aura, dizziness and double vision issues. She was accepting of this education.     Patient prognosis is Good.   Patient will benefit from skilled outpatient Physical Therapy to address the deficits stated above and in the chart below, provide patient /family education, and to maximize patientt's level of independence.     Plan of care discussed with patient: Yes  Patient's spiritual, cultural and educational needs considered and patient is agreeable to the plan of care and goals as stated below:     Anticipated Barriers for therapy: epileptic challenges    Medical Necessity is demonstrated by the following  History  Co-morbidities and personal factors that may impact the plan of care [x] LOW: no personal factors / co-morbidities  [] MODERATE: 1-2 personal factors / co-morbidities  [] HIGH: 3+ personal factors / co-morbidities    Moderate / High Support Documentation:   Co-morbidities affecting plan of care: epilepsy    Personal Factors:   no deficits     Examination  Body Structures and Functions, activity limitations and participation restrictions that may impact the plan of care [x] LOW: addressing 1-2 elements  [] MODERATE: 3+ elements  [] HIGH: 4+ elements (please support below)    Moderate / High Support Documentation: see chart     Clinical Presentation [x] LOW: stable  [] MODERATE: Evolving  [] HIGH: Unstable     Decision Making/ Complexity Score: low       Goals: to be assessed by Neuro  Therapist for visual and dizziness symptoms    Plan     Plan of care Certification: 6/21/2024 to 9/21/2024.    Outpatient Physical Therapy 1-2 times weekly for 6 weeks to include the following interventions: Manual Therapy, Moist Heat/ Ice, Neuromuscular Re-ed, Patient Education, Self Care, Therapeutic Activities, and Therapeutic Exercise.     Liyah Garcia, DORIAN        Physician's Signature: _________________________________________ Date: ________________

## 2024-06-24 ENCOUNTER — CLINICAL SUPPORT (OUTPATIENT)
Dept: REHABILITATION | Facility: HOSPITAL | Age: 34
End: 2024-06-24
Payer: MEDICAID

## 2024-06-24 DIAGNOSIS — G43.109 MIGRAINE WITH AURA AND WITHOUT STATUS MIGRAINOSUS, NOT INTRACTABLE: Primary | ICD-10-CM

## 2024-06-24 PROCEDURE — 97110 THERAPEUTIC EXERCISES: CPT | Mod: PO

## 2024-06-24 NOTE — PLAN OF CARE
[facility]   Physical Therapy Treatment Note and Neuro PT plan of care (POC) Assessment     Name: Ifrah Valentine  Clinic Number: [id number]    Therapy Diagnosis:   Encounter Diagnosis   Name Primary?    Migraine with aura and without status migrainosus, not intractable Yes     Physician: Lissy Box PA-C    Visit Date: 6/24/2024    Physician Orders: PT Eval and Treat   Medical Diagnosis from Referral:   M62.838 (ICD-10-CM) - Muscle spasms of neck   G43.109 (ICD-10-CM) - Migraine with aura and without status migrainosus, not intractable     Evaluation Date: 6/21/2024  Authorization Period Expiration: 12/21/2024  Plan of Care Expiration: 9/21/2024  Progress Note Due: 7/21/2024  Date of Surgery: n/a  Visit # / Visits authorized: 1/ 20  FOTO: 1/ 3     Precautions: seizure, standard     Time In: 11:15  Time Out: 12:00  Total Billable Time: 45 minutes    PTA Visit #: 0/5       Subjective     Patient reports: that she had a seizure in March. Migraines have been worse since that time. She gets about 3 migraines a week, lasting for a few hours. During migraine episode, she has headache, mouth watering, double vision and dizziness. Double vision and dizziness seem to go away once the migraine resolves. Endorses remaining light sensitivity and watering of eyes-- takes time for eyes to adjust in the morning before she can go outside. Denies issues with balance. Endorses muscle weakness throughout legs and arms and body fatigue following migraines. Neck spasms are better. Last migraine was Friday.     Home exercise program not provided at this time.   Response to previous treatment: no adverse response  Functional change: ongoing    Pain: 0/10  Location: NA    Objective      Objective Measures updated at progress report unless specified.     Treatment     Ifrah received the treatments listed below:      therapeutic exercises to develop vestibular system function for 45 minutes  including:    Visual/Oculomotor Screen: endorses light sensitivity, double and blurry vision during migraines which improves once migraines resolve  Ocular range of motion: WFL  Spontaneous nystagmus (fixation present): none noted  Gaze-evoked nystagmus (fixation present): none noted  Tracking/Smooth Pursuits:Intact  Saccades: Intact  Convergence: NT  VOR cancellation: NT    Acuity:Intact  Visual field: Intact  VCR: NT (head remains still, body moves)    VOR 1: Intact  Head Thrust Test: NT  DVA: NT      Lower Extremity Strength- performed with patient in sitting  Right LE  Left LE    Hip Flexion: 4+/5 Hip Flexion: 4+/5   Hip Extension:  5/5 Hip Extension: 5/5   Hip Abduction: 5/5 Hip Abduction: 5/5   Hip Adduction: 5/5 Hip Adduction 5/5   Knee Extension: 5/5 Knee Extension: 5/5   Knee Flexion: 5/5 Knee Flexion: 5/5   Ankle Dorsiflexion: 5/5 Ankle Dorsiflexion: 5/5   Ankle Plantarflexion: 5/5 Ankle Plantarflexion: 5/5     Abdominal Strength: at least 3+/5  Flexibility: endorses tightness in calf muscles, hamstrings, and adductors     Evaluation   Single Limb Stance R LE 30 sec  (<10 sec = HIGH FALL RISK)   Single Limb Stance L LE 30 sec  (<10 sec = HIGH FALL RISK)      Evaluation   5 times sit-stand  (adults 18-65 y/o) 17 seconds c/ no hands  >12 sec= fall risk for general elderly  >16 sec= fall risk for Parkinson's disease  >10 sec= balance/vestibular dysfunction (<59 y/o)  >14.2 sec= balance/vestibular dysfunction (>59 y/o)  >12 sec= fall risk for CVA       Gait Assessment:   - AD used: none  - Assistance: (I)  - Distance: community distances    GAIT DEVIATIONS:  Ifrah displays the following deviations with ambulation: no significant deviations noted    Endurance Deficit: moderate- some back pain and fatigue following testing      Evaluation   Self Selected Walking Speed 1.0 m/sec (6m/6s)   Fast Walking Speed 1.2 m/sec (6m/5s)     6 minute walk test: 1252 feet    Patient Education and Home Exercises        Education provided:   - POC    Written Home Exercises Provided: to be provided at first follow up session.     Assessment     Ifrah presents to neuro PT session for neuro assessment into further therapy needs following diagnosis of muscle spasms due to migraines. Initial portion of session spent understanding symptom presentation to better discern therapy needs. After long discussion with patient, it was determined that double vision and dizziness (for which patient was referred to vestibular PT to address) resolve once active migraine subsides. However, patient does endorse back/LE tightness, lower extremity/upper extremity weakness and fatigue for ~3 days following migraine (which occurs ~2-3 times weekly). Oculomotor screen WFL with no deficits noted. Lower extremity strength screen WFL at this time, but patient has just come out of post migraine phase. Decreased endurance noted on chair rise test and 6 minute walk test. BLE single leg stance scores WFL and symmetrical. After long discussion with patient, neuro PT to focus on home exercise program development and instruction for functional strength, core, activity tolerance, and trunk flexibility to help reduce symptoms when they appear following migraine. PT emphasized that migraines will need to be medically managed, and therapy will help instruct on how to manage residual deficits. Patient agreeable. PT to extend plan of care (POC) x 8 weeks for 1x weekly to achieve these objectives.     Ifrah Is progressing well towards her goals.   Patient prognosis is Good.     Patient will continue to benefit from skilled outpatient physical therapy to address the deficits listed in the problem list box on initial evaluation, provide pt/family education and to maximize pt's level of independence in the home and community environment.     Patient's spiritual, cultural and educational needs considered and pt agreeable to plan of care and goals.     Anticipated barriers  to physical therapy: co-morbidities    Goals:  Short Term Goals: 4 weeks   Patient to be (I) with established home exercise program for core strength, functional lower extremity/upper extremity strength, and flexibility. Ongoing  Patient to improve chair rise </ 15 seconds with good control throughout and no fatigue for improved safety with functional transfers. Ongoing  Patient to improve 6 minute walk test to at least 1300 feet with no more than 4/10 RPE for improved endurance with community ambulation. Ongoing     Long Term Goals: 8 weeks   Patient to be (I) with advanced home exercise program for core strength, functional lower extremity/upper extremity strength, and flexibility. Ongoing  Patient to improve chair rise </ 12 seconds with good control throughout and no fatigue for improved safety with functional transfers. Ongoing  Patient to improve 6 minute walk test to at least 1350 feet with no more than 2/10 RPE for improved endurance with community ambulation. Ongoing    Plan   PT to extend plan of care (POC) x 8 weeks.     Continue outpatient physical therapy 1x weekly under current established Plan of Care, 06/24/24 to 08/23/24, with treatment to include: pt education, HEP, therapeutic exercises, neuromuscular re-education/balance exercises, therapeutic activities, joint mobilizations, and modalities PRN, to work towards established goals. Pt may be seen by PTA to carry out plan of care.     Focus on functional endurance, core/lower extremity/upper extremity strength, and flexibility.     Dahiana Kelly, PT

## 2024-06-24 NOTE — PROGRESS NOTES
[facility]   Physical Therapy Treatment Note and Neuro PT plan of care (POC) Assessment     Name: Ifrah Valentine  Clinic Number: [id number]    Therapy Diagnosis:   Encounter Diagnosis   Name Primary?    Migraine with aura and without status migrainosus, not intractable Yes     Physician: Lissy Box PA-C    Visit Date: 6/24/2024    Physician Orders: PT Eval and Treat   Medical Diagnosis from Referral:   M62.838 (ICD-10-CM) - Muscle spasms of neck   G43.109 (ICD-10-CM) - Migraine with aura and without status migrainosus, not intractable     Evaluation Date: 6/21/2024  Authorization Period Expiration: 12/21/2024  Plan of Care Expiration: 9/21/2024  Progress Note Due: 7/21/2024  Date of Surgery: n/a  Visit # / Visits authorized: 1/ 20  FOTO: 1/ 3     Precautions: seizure, standard     Time In: 11:15  Time Out: 12:00  Total Billable Time: 45 minutes    PTA Visit #: 0/5       Subjective     Patient reports: that she had a seizure in March. Migraines have been worse since that time. She gets about 3 migraines a week, lasting for a few hours. During migraine episode, she has headache, mouth watering, double vision and dizziness. Double vision and dizziness seem to go away once the migraine resolves. Endorses remaining light sensitivity and watering of eyes-- takes time for eyes to adjust in the morning before she can go outside. Denies issues with balance. Endorses muscle weakness throughout legs and arms and body fatigue following migraines. Neck spasms are better. Last migraine was Friday.     Home exercise program not provided at this time.   Response to previous treatment: no adverse response  Functional change: ongoing    Pain: 0/10  Location: NA    Objective      Objective Measures updated at progress report unless specified.     Treatment     Ifrah received the treatments listed below:      therapeutic exercises to develop vestibular system function for 45 minutes  including:    Visual/Oculomotor Screen: endorses light sensitivity, double and blurry vision during migraines which improves once migraines resolve  Ocular range of motion: WFL  Spontaneous nystagmus (fixation present): none noted  Gaze-evoked nystagmus (fixation present): none noted  Tracking/Smooth Pursuits:Intact  Saccades: Intact  Convergence: NT  VOR cancellation: NT    Acuity:Intact  Visual field: Intact  VCR: NT (head remains still, body moves)    VOR 1: Intact  Head Thrust Test: NT  DVA: NT      Lower Extremity Strength- performed with patient in sitting  Right LE  Left LE    Hip Flexion: 4+/5 Hip Flexion: 4+/5   Hip Extension:  5/5 Hip Extension: 5/5   Hip Abduction: 5/5 Hip Abduction: 5/5   Hip Adduction: 5/5 Hip Adduction 5/5   Knee Extension: 5/5 Knee Extension: 5/5   Knee Flexion: 5/5 Knee Flexion: 5/5   Ankle Dorsiflexion: 5/5 Ankle Dorsiflexion: 5/5   Ankle Plantarflexion: 5/5 Ankle Plantarflexion: 5/5     Abdominal Strength: at least 3+/5  Flexibility: endorses tightness in calf muscles, hamstrings, and adductors     Evaluation   Single Limb Stance R LE 30 sec  (<10 sec = HIGH FALL RISK)   Single Limb Stance L LE 30 sec  (<10 sec = HIGH FALL RISK)      Evaluation   5 times sit-stand  (adults 18-63 y/o) 17 seconds c/ no hands  >12 sec= fall risk for general elderly  >16 sec= fall risk for Parkinson's disease  >10 sec= balance/vestibular dysfunction (<59 y/o)  >14.2 sec= balance/vestibular dysfunction (>59 y/o)  >12 sec= fall risk for CVA       Gait Assessment:   - AD used: none  - Assistance: (I)  - Distance: community distances    GAIT DEVIATIONS:  Ifrah displays the following deviations with ambulation: no significant deviations noted    Endurance Deficit: moderate- some back pain and fatigue following testing      Evaluation   Self Selected Walking Speed 1.0 m/sec (6m/6s)   Fast Walking Speed 1.2 m/sec (6m/5s)     6 minute walk test: 1252 feet    Patient Education and Home Exercises        Education provided:   - POC    Written Home Exercises Provided: to be provided at first follow up session.     Assessment     Ifrah presents to neuro PT session for neuro assessment into further therapy needs following diagnosis of muscle spasms due to migraines. Initial portion of session spent understanding symptom presentation to better discern therapy needs. After long discussion with patient, it was determined that double vision and dizziness (for which patient was referred to vestibular PT to address) resolve once active migraine subsides. However, patient does endorse back/LE tightness, lower extremity/upper extremity weakness and fatigue for ~3 days following migraine (which occurs ~2-3 times weekly). Oculomotor screen WFL with no deficits noted. Lower extremity strength screen WFL at this time, but patient has just come out of post migraine phase. Decreased endurance noted on chair rise test and 6 minute walk test. BLE single leg stance scores WFL and symmetrical. After long discussion with patient, neuro PT to focus on home exercise program development and instruction for functional strength, core, activity tolerance, and trunk flexibility to help reduce symptoms when they appear following migraine. PT emphasized that migraines will need to be medically managed, and therapy will help instruct on how to manage residual deficits. Patient agreeable. PT to extend plan of care (POC) x 8 weeks for 1x weekly to achieve these objectives.     Ifrah Is progressing well towards her goals.   Patient prognosis is Good.     Patient will continue to benefit from skilled outpatient physical therapy to address the deficits listed in the problem list box on initial evaluation, provide pt/family education and to maximize pt's level of independence in the home and community environment.     Patient's spiritual, cultural and educational needs considered and pt agreeable to plan of care and goals.     Anticipated barriers  to physical therapy: co-morbidities    Goals:  Short Term Goals: 4 weeks   Patient to be (I) with established home exercise program for core strength, functional lower extremity/upper extremity strength, and flexibility. Ongoing  Patient to improve chair rise </ 15 seconds with good control throughout and no fatigue for improved safety with functional transfers. Ongoing  Patient to improve 6 minute walk test to at least 1300 feet with no more than 4/10 RPE for improved endurance with community ambulation. Ongoing     Long Term Goals: 8 weeks   Patient to be (I) with advanced home exercise program for core strength, functional lower extremity/upper extremity strength, and flexibility. Ongoing  Patient to improve chair rise </ 12 seconds with good control throughout and no fatigue for improved safety with functional transfers. Ongoing  Patient to improve 6 minute walk test to at least 1350 feet with no more than 2/10 RPE for improved endurance with community ambulation. Ongoing    Plan   PT to extend plan of care (POC) x 8 weeks.     Continue outpatient physical therapy 1x weekly under current established Plan of Care, 06/24/24 to 08/23/24, with treatment to include: pt education, HEP, therapeutic exercises, neuromuscular re-education/balance exercises, therapeutic activities, joint mobilizations, and modalities PRN, to work towards established goals. Pt may be seen by PTA to carry out plan of care.     Focus on functional endurance, core/lower extremity/upper extremity strength, and flexibility.     Dahiana Kelly, PT

## 2024-07-01 ENCOUNTER — TELEPHONE (OUTPATIENT)
Dept: NEUROLOGY | Facility: CLINIC | Age: 34
End: 2024-07-01
Payer: MEDICAID

## 2024-07-15 ENCOUNTER — CLINICAL SUPPORT (OUTPATIENT)
Dept: REHABILITATION | Facility: HOSPITAL | Age: 34
End: 2024-07-15
Payer: MEDICAID

## 2024-07-15 DIAGNOSIS — G43.109 MIGRAINE WITH AURA AND WITHOUT STATUS MIGRAINOSUS, NOT INTRACTABLE: Primary | ICD-10-CM

## 2024-07-15 PROCEDURE — 97110 THERAPEUTIC EXERCISES: CPT | Mod: PO

## 2024-07-15 NOTE — PROGRESS NOTES
[facility]   Physical Therapy Treatment Note and Neuro PT plan of care (POC) Assessment     Name: Ifrah Valentine  Clinic Number: [id number]    Therapy Diagnosis:   Encounter Diagnosis   Name Primary?    Migraine with aura and without status migrainosus, not intractable Yes       Physician: Lissy Box PA-C    Visit Date: 7/15/2024    Physician Orders: PT Eval and Treat   Medical Diagnosis from Referral:   M62.838 (ICD-10-CM) - Muscle spasms of neck   G43.109 (ICD-10-CM) - Migraine with aura and without status migrainosus, not intractable     Evaluation Date: 2024  Authorization Period Expiration: 2024  Plan of Care Expiration: 2024  Progress Note Due: 2024  Date of Surgery: n/a  Visit # / Visits authorized: 3/ 20 + eval  FOTO: 1/ 3     Precautions: seizure, standard     Time In: 1625  Time Out: 1655  Total Billable Time: 30 minutes    PTA Visit #: 0/5       Subjective     Patient reports: that she is beginning to get a migraine due to orthopedic PT she just left from.     Home exercise program not provided at this time.   Response to previous treatment: no adverse response  Functional change: ongoing    Pain: 0/10  Location: NA    Objective      Last taken on 24.     Treatment     Ifrah received the treatments listed below:      therapeutic exercises to develop vestibular system function for 30 minutes includin x 30s bilaterally, Hamstring stretch at steps     2 x 30s bilaterally, Supine hip flexor stretch (Modified Usman)     2 x 10 reps bilaterally, Supine straight leg raises  2 x 8 reps bilaterally, Sidelying abduction leg raise  X 10 reps bilaterally, Clams   X 10 reps, bridges   X 10 reps bilaterally, Prone hip extensions    Patient Education and Home Exercises       Home Exercises Provided and Patient Education Provided     Education provided:   - HEP    Written Home Exercises Provided: yes.  Exercises were reviewed and Ifrah was able to  demonstrate them prior to the end of the session.  Ifrah demonstrated good  understanding of the education provided.     See EMR under Patient Instructions for exercises provided 7/15/2024.      Assessment     Ifrah tolerated today's session well. Session emphasized education in lower extremity strengthening home exercise program. Frequent rest break take throughout session due to fatigue. Next session to review LE home exercise program and consider initiation of upper body home exercise program.      Ifrah Is progressing well towards her goals.   Patient prognosis is Good.     Patient will continue to benefit from skilled outpatient physical therapy to address the deficits listed in the problem list box on initial evaluation, provide pt/family education and to maximize pt's level of independence in the home and community environment.     Patient's spiritual, cultural and educational needs considered and pt agreeable to plan of care and goals.     Anticipated barriers to physical therapy: co-morbidities    Goals:  Short Term Goals: 4 weeks   Patient to be (I) with established home exercise program for core strength, functional lower extremity/upper extremity strength, and flexibility. Ongoing  Patient to improve chair rise </ 15 seconds with good control throughout and no fatigue for improved safety with functional transfers. Ongoing  Patient to improve 6 minute walk test to at least 1300 feet with no more than 4/10 RPE for improved endurance with community ambulation. Ongoing     Long Term Goals: 8 weeks   Patient to be (I) with advanced home exercise program for core strength, functional lower extremity/upper extremity strength, and flexibility. Ongoing  Patient to improve chair rise </ 12 seconds with good control throughout and no fatigue for improved safety with functional transfers. Ongoing  Patient to improve 6 minute walk test to at least 1350 feet with no more than 2/10 RPE for improved endurance with  community ambulation. Ongoing    Plan   Focus on functional endurance, core/lower extremity/upper extremity strength, and flexibility.     Kendra García, PT

## 2024-07-17 ENCOUNTER — PATIENT MESSAGE (OUTPATIENT)
Dept: NEUROLOGY | Facility: CLINIC | Age: 34
End: 2024-07-17
Payer: MEDICAID

## 2024-07-25 ENCOUNTER — PATIENT MESSAGE (OUTPATIENT)
Dept: NEUROLOGY | Facility: CLINIC | Age: 34
End: 2024-07-25
Payer: MEDICAID

## 2024-08-26 ENCOUNTER — TELEPHONE (OUTPATIENT)
Dept: OPHTHALMOLOGY | Facility: CLINIC | Age: 34
End: 2024-08-26
Payer: MEDICAID

## 2024-08-26 ENCOUNTER — PATIENT MESSAGE (OUTPATIENT)
Dept: NEUROLOGY | Facility: CLINIC | Age: 34
End: 2024-08-26

## 2024-08-26 ENCOUNTER — OFFICE VISIT (OUTPATIENT)
Dept: NEUROLOGY | Facility: CLINIC | Age: 34
End: 2024-08-26
Payer: MEDICAID

## 2024-08-26 DIAGNOSIS — G43.109 MIGRAINE WITH AURA AND WITHOUT STATUS MIGRAINOSUS, NOT INTRACTABLE: ICD-10-CM

## 2024-08-26 DIAGNOSIS — H53.9 VISION CHANGES: ICD-10-CM

## 2024-08-26 DIAGNOSIS — R51.9 HEADACHE, CHRONIC DAILY: ICD-10-CM

## 2024-08-26 DIAGNOSIS — G40.219 COMPLEX PARTIAL EPILEPSY WITH GENERALIZATION AND WITH INTRACTABLE EPILEPSY: Primary | ICD-10-CM

## 2024-08-26 PROCEDURE — 99214 OFFICE O/P EST MOD 30 MIN: CPT | Mod: 95,,,

## 2024-08-26 RX ORDER — SUMATRIPTAN 50 MG/1
50 TABLET, FILM COATED ORAL DAILY PRN
Qty: 12 TABLET | Refills: 1 | Status: SHIPPED | OUTPATIENT
Start: 2024-08-26 | End: 2024-09-25

## 2024-08-26 NOTE — TELEPHONE ENCOUNTER
----- Message from Lydia Marroquin sent at 8/26/2024  1:01 PM CDT -----  Regarding: FW: Referral  The referral is for neuro not retina.  ----- Message -----  From: Yumiko Greenfield  Sent: 8/26/2024  11:35 AM CDT  To: [facility] Staff  Subject: Referral                                             What is the request in detail: Please call pt to schedule Ophthalmology referral.Please advise.    Can the clinic reply by [facility]? No    Best Call Back Number: [phone]        Additional Information:
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
## 2024-08-26 NOTE — PATIENT INSTRUCTIONS
You came to [facility] because of your seizure disorder. Please continue the same medication at the same dose.     Do not miss any doses of medication. If a dose of medication is missed, take it as soon as it is remembered even if that means doubling up on the dose.     For migraines, please write a headache diary with special attention to headache symptoms, durations, triggers, medications used and bring this to the next clinic visit. For posterior head and neck pain, try a rolled towel or tuck your pillow underneath your neck while you sleep for comfort. Ice/heat/massage. Physical Therapy for neck muscle spasms. Please try yoga to help take some of the strain off your back and to work on strength and flexibility.  For pain, you can take over-the-counter ibuprofen (max dose 400-600 mg every 8 hr)/naproxen (max dose 500 mg every 12 hr) or an acetaminophen formulation like Excedrin, Tylenol, or Goody's powder (max dose 1 g every 8 hr). You can also use Sumatriptan. Please take one tablet right at aura/headache onset. You may take a second tablet after an hour if the pain persists. It is important that you do not take any of these medications more than one full day every 4 days (7 days a month).  If you take them more frequently than that, it can contribute to a medication overuse headache as well as other issues such as a GI upset and/or kidney/liver injury.         I placed a referral to neuro-ophthalmology and to the headache team. They will call you to schedule these appointments. If you do not hear from them, please call [facility] main line ([phone]) and ask them to transfer you to the ophthalmology (or neurology clinic). Let them know your doctor placed a referral and you are trying to schedule an appointment. Let me know if you have any issues. If needed, try calling the [facility] for further assistance finding care: #[phone].     Get regular sleep. Go to sleep at the same time and  wake up at the same time every day. People with epilepsy require more sleep than people without epilepsy.  Sleeping 10-12 hours a day can be normal for a person with epilepsy.  Every seizure makes it harder to prevent the next seizure. Epilepsy is associated with SUDEP, or sudden unexpected death in epilepsy.  The risk is significantly higher if convulsive seizures are not well controlled. For more information, check out these websites: https://www.epilepsy.com/, https://www.epilepsyallianceamer[place].org/, www.lucio-epilepsy.org, www.womenandepilepsy.org.  If you are interested in meeting other individuals in our epilepsy community, please reach out to the [facility] [place] ([phone], [phone], info@[place].org).  They organize many informative and fun activities in the region.  They can provide you invaluable information on how to get access to resources available for patients living with epilepsy as well as a rich community of like-minded individuals who are all learning to cope with the same issues.  It is very important to remember, you are not alone.     Per [place] law, no episodes of loss of consciousness for 6 months before driving.  Avoid dangerous situations.  For example, no baths/pools alone, no heights, no power tools.  Wear a bike helmet.  If breakthrough seizures occur that are different in character, frequency, or duration from normal episodes, please patient portal me or call the office and we will decide the next steps. If multiple seizures occur in a row without return back to baseline, 911 needs to be called.     Return to clinic in 3 months or sooner with issues.  Please patient portal with any questions or concerns.    Lissy Box PA-C   Neurology-Epilepsy  [facility]

## 2024-08-27 DIAGNOSIS — G40.219 COMPLEX PARTIAL EPILEPSY WITH GENERALIZATION AND WITH INTRACTABLE EPILEPSY: Primary | ICD-10-CM

## 2024-08-30 ENCOUNTER — TELEPHONE (OUTPATIENT)
Dept: NEUROLOGY | Facility: CLINIC | Age: 34
End: 2024-08-30

## 2024-09-03 NOTE — PROGRESS NOTES
Established Patient     The patient location is: [place]  The chief complaint leading to consultation is: headache follow up visit    Visit type: audiovisual    Face to Face time with patient: 27 min  32 minutes of total time spent on the encounter, which includes face to face time and non-face to face time preparing to see the patient (eg, review of tests), Obtaining and/or reviewing separately obtained history, Documenting clinical information in the electronic or other health record, Independently interpreting results (not separately reported) and communicating results to the patient/family/caregiver, or Care coordination (not separately reported).     Each patient to whom he or she provides medical services by telemedicine is:  (1) informed of the relationship between the physician and patient and the respective role of any other health care provider with respect to management of the patient; and (2) notified that he or she may decline to receive medical services by telemedicine and may withdraw from such care at any time.    Notes:        SUBJECTIVE:  Patient ID: Ifrah Valentine   Chief Complaint: Headache    History of Present Illness:  Ifrah Valentine is a 34 y.o. female with migraines, seizures, anxiety, depression  who presents via virtual visit alone for follow-up of headaches.       09/06/2024 - Interval History:  Christine's worsened a couple of months ago, are mild to severe. At this time they are occurring 20/30 days per month lasting 1 hr - 1/2 a day. She has an associated photophobia. She has tried tizanidine which somewhat helps but causes drowsiness. She also tried imitrex 50mg but didn't help. She typically takes ibuprofen 800mg which helps, takes it 3 times total a month. Discussed multiple options.   Plan: start quilipta 60mg/d, try increasing imitrex 100mg prn, limit ibuprofen 800mg use to avoid rebound ha's, track ha's, start supplements, rtc 2-3 mo    12/01/2023 - Interval History:  Pt states ha's occur 3-4/30  days per month. Uses ibuprofen infrequently, does not exceed 3 days a week. Refills given. Will f/up prn.     Recommendations made at last Office Visit on 6/12/23:  Discussed symptoms appear to be consistent with migraines. Discussed this with patient. Discussed multiple options w/ pt including daily oral ppx's, increasing zonisamide, cgrp inhibitors, botox, gepants, and steroid taper. Pt defers these options and verbalizes a wish to focus on non-medication options. Discussed non-medication options such as vitamins, trigger mgmt, and neuro-modulation devices. Pt agreeable to these. Pt also states she wishes to only take ibuproefn 800mg prn for an abortive option. Counseled on risks including overuse and to limit to <2-3 days in a week, pt verbalizes her understanding.   Pt will try mag ox and trigger mgmt, she will also continue ibuprofen 800mg limited to <2-3 days in a week, and f/up prn.     Treatments Tried:  Sertraline  Lamictal  Keppra - se's  Zonisamide - no improvement in hidalgo's  Cgrp inhibitors - avoid 2/2 needle phobia  Botox - avoid 2/2 needle phobia  Anti-depressants - avoid 2/2 potential interactions w/ lamictal  Propranolol - avoid 2/2 potential worsening of depression  Ibuprofen 800mg - helps  Imitrex 50mg - didn't help  Tylenol pm, excedrin, goodys  Tizanidine - drowsiness, somewhat helped    Current Medications:    Current Outpatient Medications:     (Magic mouthwash) 1:1:1 diphenhydrAMINE(Benadryl) 12.5mg/5ml liq, aluminum & magnesium hydroxide-simethicone (Maalox), LIDOcaine viscous 2%, Swish and spit 15 mLs every 4 (four) hours as needed (tongue laceration)., Disp: 500 mL, Rfl: 1    atogepant (QULIPTA) 60 mg Tab, Take 1 tablet (60 mg total) by mouth once daily., Disp: 30 tablet, Rfl: 5    DRYSOL DAB-O-MATIC 20 % external solution, Apply topically nightly., Disp: , Rfl:     ibuprofen (ADVIL,MOTRIN) 800 MG tablet, Take 1 tablet (800 mg total) by mouth every 8 (eight) hours as needed (for headache). Do  not take more than 3,200 mg in a day. Or more than 2-3 days in a week, Disp: 20 tablet, Rfl: 2    lamotrigine XR (LAMICTAL XR) 300 mg XR tablet, Take 1 tablet (300 mg total) by mouth once daily., Disp: 90 tablet, Rfl: 3    midazolam (NAYZILAM) 5 mg/spray (0.1 mL) Spry, 1 spray by Nasal route as needed (for prolonged seizure or seizure cluster). After 10 minutes if seizure activity continues, an additional spray can be administered in the other nostril., Disp: 2 each, Rfl: 2    sumatriptan (IMITREX) 100 MG tablet, Take 1 tab PO at onset of migraine, can repeat in 2 hrs if needed.  No more than twice per day or 3 days/wk., Disp: 18 tablet, Rfl: 5    tretinoin (RETIN-A) 0.025 % cream, Apply topically nightly., Disp: , Rfl:     Review of Systems - as per HPI, otherwise a balanced 10 systems review is negative.    OBJECTIVE:  Vitals:  There were no vitals taken for this visit.     Physical Exam:  Constitutional: she appears well-developed and well-nourished. she is well groomed. NAD   HENT:    Head: Normocephalic and atraumatic  Eyes: Conjunctivae and EOM are normal  Musculoskeletal: Normal range of motion. No joint stiffness.   Psychiatric: Mood and affect are normal    Neuro: Patient is alert and oriented to person, place, and time. Language is intact and fluent. Speech is clear and fluent. Recent and remote memory are intact.  Normal attention and concentration.  Facial movement is symmetric. Moves all 4 extremities against gravity.      Review of Data:   Notes from neuro reviewed   Labs:  No visits with results within 3 Month(s) from this visit.   Latest known visit with results is:   Lab Visit on 10/11/2022   Component Date Value Ref Range Status    Lamotrigine Lvl 10/11/2022 4.9  2.0 - 15.0 ug/mL Final     Imaging:  Results for orders placed or performed during the hospital encounter of 08/14/21   CT Head Without Contrast    Narrative    EXAMINATION:  CT HEAD WITHOUT CONTRAST    CLINICAL HISTORY:  Headache, acute,  normal neuro exam;    TECHNIQUE:  Low dose axial images were obtained through the head.  Coronal and sagittal reformations were also performed. Contrast was not administered.    COMPARISON:  None.    FINDINGS:  There is no acute intracranial hemorrhage, hydrocephalus, midline shift or mass effect. Gray-white matter differentiation appears maintained. The basal cisterns are patent. The mastoid air cells and paranasal sinuses are clear of acute process. The visualized bones of the calvarium demonstrate no acute osseous abnormality.      Impression    No CT evidence of acute intracranial abnormality. Clinical correlation and further evaluation as warranted.      Electronically signed by: Fly Guerra MD  Date:    08/14/2021  Time:    05:25     Note: I have independently reviewed any/all imaging/labs/tests and agree with the report (s) as documented.  Any discrepancies will be as noted/demarcated by free text.  AUDELIA PETERS 9/6/2024    ASSESSMENT:  1. Migraine with aura and without status migrainosus, not intractable          PLAN:  - pt wishes to avoid ppx's that can cause drowsiness, injections 2/2 needle phobia, anti-depressants due to potential interaction w/ lamictal  - ppx - start quilipta 60mg and supplements  - abortive - continue infrequent ibuprofen <2-3 days a week, increase imitrex 100mg prn  - track headaches   - Discussed goals of therapy are to decrease the frequency, intensity, and duration of headaches  - RTC 2-3 mo     Orders Placed This Encounter    sumatriptan (IMITREX) 100 MG tablet    atogepant (QULIPTA) 60 mg Tab       Discussed potential for teratogenicity with treatment, patient understands if her family planning status should change she will contact office immediately and we will safely adjust medications as needed.     Questions and concerns were sought and answered to the patient's stated verbal satisfaction.  The patient verbalizes understanding and agreement with the above stated treatment  plan.     CC: No, Primary Doctor      Maddy Kellogg PA-C  [facility]   [phone]    Dr. Bond was available during today's encounter.     Visit today included increased complexity associated with the care of the episodic problem migraines addressed and managing the longitudinal care of the patient due to the serious and/or complex managed problem(s) migraines.

## 2024-09-06 ENCOUNTER — OFFICE VISIT (OUTPATIENT)
Dept: NEUROLOGY | Facility: CLINIC | Age: 34
End: 2024-09-06
Payer: MEDICAID

## 2024-09-06 DIAGNOSIS — G43.109 MIGRAINE WITH AURA AND WITHOUT STATUS MIGRAINOSUS, NOT INTRACTABLE: ICD-10-CM

## 2024-09-06 RX ORDER — ATOGEPANT 60 MG/1
60 TABLET ORAL DAILY
Qty: 30 TABLET | Refills: 5 | Status: SHIPPED | OUTPATIENT
Start: 2024-09-06 | End: 2025-03-05

## 2024-09-06 RX ORDER — SUMATRIPTAN SUCCINATE 100 MG/1
TABLET ORAL
Qty: 18 TABLET | Refills: 5 | Status: SHIPPED | OUTPATIENT
Start: 2024-09-06 | End: 2025-09-06

## 2024-10-03 ENCOUNTER — TELEPHONE (OUTPATIENT)
Dept: NEUROLOGY | Facility: CLINIC | Age: 34
End: 2024-10-03
Payer: MEDICAID

## 2024-10-03 NOTE — TELEPHONE ENCOUNTER
----- Message from Martinez Castaneda sent at 9/6/2024  9:44 AM CDT -----  Can you schedule her please.  ----- Message -----  From: Maddy Kellogg PA-C  Sent: 9/6/2024   9:31 AM CDT  To: Valdez Castañeda Staff    3 mo f/up pls

## 2024-10-09 ENCOUNTER — PATIENT MESSAGE (OUTPATIENT)
Dept: NEUROLOGY | Facility: CLINIC | Age: 34
End: 2024-10-09
Payer: MEDICAID

## 2024-12-05 NOTE — PROGRESS NOTES
Established Patient     The patient location is: [place]  The chief complaint leading to consultation is: headache follow up visit    Visit type: audiovisual    Face to Face time with patient: 3 min  6 minutes of total time spent on the encounter, which includes face to face time and non-face to face time preparing to see the patient (eg, review of tests), Obtaining and/or reviewing separately obtained history, Documenting clinical information in the electronic or other health record, Independently interpreting results (not separately reported) and communicating results to the patient/family/caregiver, or Care coordination (not separately reported).     Each patient to whom he or she provides medical services by telemedicine is:  (1) informed of the relationship between the physician and patient and the respective role of any other health care provider with respect to management of the patient; and (2) notified that he or she may decline to receive medical services by telemedicine and may withdraw from such care at any time.    Notes:        SUBJECTIVE:  Patient ID: Ifrah Valentine   Chief Complaint: Headache    History of Present Illness:  Ifrah Valentine is a 34 y.o. female with migraines, seizures, anxiety, depression  who presents via virtual visit alone for follow-up of headaches.       12/06/2024 - Interval History:  Ha's have improved greatly since starting quilipta. Has tried increased imitrex dose once thus far w/ resolution of ha noted. No longer taking ibuprofen.   Plan: contnue quilipta 60mg/d, imitrex 100mg prn, limit ibuprofen 800mg use to avoid rebound ha's, track ha's, start supplements, rtc 6 mo    09/06/2024 - Interval History:  Christine's worsened a couple of months ago, are mild to severe. At this time they are occurring 20/30 days per month lasting 1 hr - 1/2 a day. She has an associated photophobia. She has tried tizanidine which somewhat helps but causes drowsiness. She also tried imitrex 50mg but didn't help.  She typically takes ibuprofen 800mg which helps, takes it 3 times total a month. Discussed multiple options.   Plan: start quilipta 60mg/d, try increasing imitrex 100mg prn, limit ibuprofen 800mg use to avoid rebound ha's, track ha's, start supplements, rtc 2-3 mo    12/01/2023 - Interval History:  Pt states ha's occur 3-4/30 days per month. Uses ibuprofen infrequently, does not exceed 3 days a week. Refills given. Will f/up prn.     Recommendations made at last Office Visit on 6/12/23:  Discussed symptoms appear to be consistent with migraines. Discussed this with patient. Discussed multiple options w/ pt including daily oral ppx's, increasing zonisamide, cgrp inhibitors, botox, gepants, and steroid taper. Pt defers these options and verbalizes a wish to focus on non-medication options. Discussed non-medication options such as vitamins, trigger mgmt, and neuro-modulation devices. Pt agreeable to these. Pt also states she wishes to only take ibuproefn 800mg prn for an abortive option. Counseled on risks including overuse and to limit to <2-3 days in a week, pt verbalizes her understanding.   Pt will try mag ox and trigger mgmt, she will also continue ibuprofen 800mg limited to <2-3 days in a week, and f/up prn.     Treatments Tried:  Sertraline  Lamictal  Keppra - se's  Zonisamide - no improvement in hidalgo's  Cgrp inhibitors - avoid 2/2 needle phobia  Botox - avoid 2/2 needle phobia  Anti-depressants - avoid 2/2 potential interactions w/ lamictal  Propranolol - avoid 2/2 potential worsening of depression  Quilipta 60mg - helps  Ibuprofen 800mg - helps  Imitrex 100mg - helps  Tylenol pm, excedrin, goodys  Tizanidine - drowsiness, somewhat helped    Current Medications:    Current Outpatient Medications:     (Magic mouthwash) 1:1:1 diphenhydrAMINE(Benadryl) 12.5mg/5ml liq, aluminum & magnesium hydroxide-simethicone (Maalox), LIDOcaine viscous 2%, Swish and spit 15 mLs every 4 (four) hours as needed (tongue laceration).,  Disp: 500 mL, Rfl: 1    atogepant (QULIPTA) 60 mg Tab, Take 1 tablet (60 mg total) by mouth once daily., Disp: 30 tablet, Rfl: 5    DRYSOL DAB-O-MATIC 20 % external solution, Apply topically nightly., Disp: , Rfl:     ibuprofen (ADVIL,MOTRIN) 800 MG tablet, Take 1 tablet (800 mg total) by mouth every 8 (eight) hours as needed (for headache). Do not take more than 3,200 mg in a day. Or more than 2-3 days in a week, Disp: 20 tablet, Rfl: 2    lamotrigine XR (LAMICTAL XR) 300 mg XR tablet, Take 1 tablet (300 mg total) by mouth once daily., Disp: 90 tablet, Rfl: 3    midazolam (NAYZILAM) 5 mg/spray (0.1 mL) Spry, 1 spray by Nasal route as needed (for prolonged seizure or seizure cluster). After 10 minutes if seizure activity continues, an additional spray can be administered in the other nostril., Disp: 2 each, Rfl: 2    sumatriptan (IMITREX) 100 MG tablet, Take 1 tab PO at onset of migraine, can repeat in 2 hrs if needed.  No more than twice per day or 3 days/wk., Disp: 18 tablet, Rfl: 5    tretinoin (RETIN-A) 0.025 % cream, Apply topically nightly., Disp: , Rfl:     Review of Systems - as per HPI, otherwise a balanced 10 systems review is negative.    OBJECTIVE:  Vitals:  There were no vitals taken for this visit.     Physical Exam:  Constitutional: she appears well-developed and well-nourished. she is well groomed. NAD   HENT:    Head: Normocephalic and atraumatic  Eyes: Conjunctivae and EOM are normal  Musculoskeletal: Normal range of motion. No joint stiffness.   Psychiatric: Mood and affect are normal    Neuro: Patient is alert and oriented to person, place, and time. Language is intact and fluent. Speech is clear and fluent. Recent and remote memory are intact.  Normal attention and concentration.  Facial movement is symmetric. Moves all 4 extremities against gravity.      Review of Data:   Notes from neuro reviewed   Labs:  No visits with results within 3 Month(s) from this visit.   Latest known visit with  results is:   Lab Visit on 10/11/2022   Component Date Value Ref Range Status    Lamotrigine Lvl 10/11/2022 4.9  2.0 - 15.0 ug/mL Final     Imaging:  Results for orders placed or performed during the hospital encounter of 08/14/21   CT Head Without Contrast    Narrative    EXAMINATION:  CT HEAD WITHOUT CONTRAST    CLINICAL HISTORY:  Headache, acute, normal neuro exam;    TECHNIQUE:  Low dose axial images were obtained through the head.  Coronal and sagittal reformations were also performed. Contrast was not administered.    COMPARISON:  None.    FINDINGS:  There is no acute intracranial hemorrhage, hydrocephalus, midline shift or mass effect. Gray-white matter differentiation appears maintained. The basal cisterns are patent. The mastoid air cells and paranasal sinuses are clear of acute process. The visualized bones of the calvarium demonstrate no acute osseous abnormality.      Impression    No CT evidence of acute intracranial abnormality. Clinical correlation and further evaluation as warranted.      Electronically signed by: Fly Guerra MD  Date:    08/14/2021  Time:    05:25     Note: I have independently reviewed any/all imaging/labs/tests and agree with the report (s) as documented.  Any discrepancies will be as noted/demarcated by free text.  AUDELIA PETERS 12/6/2024    ASSESSMENT:  1. Migraine with aura and without status migrainosus, not intractable            PLAN:  - pt wishes to avoid ppx's that can cause drowsiness, injections 2/2 needle phobia, anti-depressants due to potential interaction w/ lamictal  - ppx - continue quilipta 60mg and supplements  - abortive - continue infrequent ibuprofen <2-3 days a week, imitrex 100mg prn  - track headaches   - Discussed goals of therapy are to decrease the frequency, intensity, and duration of headaches  - RTC 6 mo            Discussed potential for teratogenicity with treatment, patient understands if her family planning status should change she will contact  office immediately and we will safely adjust medications as needed.     Questions and concerns were sought and answered to the patient's stated verbal satisfaction.  The patient verbalizes understanding and agreement with the above stated treatment plan.     CC: No, Primary Doctor      Maddy Kellogg PA-C  [facility]   [phone]    Dr. Bond was available during today's encounter.     Visit today included increased complexity associated with the care of the episodic problem migraines addressed and managing the longitudinal care of the patient due to the serious and/or complex managed problem(s) migraines.

## 2024-12-06 ENCOUNTER — OFFICE VISIT (OUTPATIENT)
Dept: NEUROLOGY | Facility: CLINIC | Age: 34
End: 2024-12-06
Payer: MEDICAID

## 2024-12-06 DIAGNOSIS — G43.109 MIGRAINE WITH AURA AND WITHOUT STATUS MIGRAINOSUS, NOT INTRACTABLE: Primary | ICD-10-CM

## 2024-12-12 DIAGNOSIS — G40.219 LOCALIZATION-RELATED (FOCAL) (PARTIAL) SYMPTOMATIC EPILEPSY AND EPILEPTIC SYNDROMES WITH COMPLEX PARTIAL SEIZURES, INTRACTABLE, WITHOUT STATUS EPILEPTICUS: ICD-10-CM

## 2024-12-13 DIAGNOSIS — G40.219 LOCALIZATION-RELATED (FOCAL) (PARTIAL) SYMPTOMATIC EPILEPSY AND EPILEPTIC SYNDROMES WITH COMPLEX PARTIAL SEIZURES, INTRACTABLE, WITHOUT STATUS EPILEPTICUS: ICD-10-CM

## 2024-12-16 RX ORDER — LAMOTRIGINE 300 MG/1
300 TABLET, EXTENDED RELEASE ORAL
Qty: 90 TABLET | Refills: 3 | Status: SHIPPED | OUTPATIENT
Start: 2024-12-16

## 2024-12-16 RX ORDER — LAMOTRIGINE 300 MG/1
300 TABLET, EXTENDED RELEASE ORAL DAILY
Qty: 90 TABLET | Refills: 3 | Status: SHIPPED | OUTPATIENT
Start: 2024-12-16 | End: 2025-12-16

## 2024-12-27 DIAGNOSIS — G40.219 LOCALIZATION-RELATED (FOCAL) (PARTIAL) SYMPTOMATIC EPILEPSY AND EPILEPTIC SYNDROMES WITH COMPLEX PARTIAL SEIZURES, INTRACTABLE, WITHOUT STATUS EPILEPTICUS: ICD-10-CM

## 2024-12-27 NOTE — TELEPHONE ENCOUNTER
----- Message from Mercedes sent at 12/13/2024 12:09 PM CST -----  Regarding: Refill Request  Contact: Pt @ [phone]               Is this a Refill or New Rx: refill         Rx Name and Strength:lamotrigine XR (LAMICTAL XR) 300 mg XR tablet         Preferred Pharmacy with phone number:    [id number] [address] [facility]  [address]  [facility] [address]  Phone: [phone] Fax: [phone]

## 2024-12-30 RX ORDER — LAMOTRIGINE 300 MG/1
300 TABLET, EXTENDED RELEASE ORAL NIGHTLY
Qty: 90 TABLET | Refills: 3 | Status: SHIPPED | OUTPATIENT
Start: 2024-12-30 | End: 2025-12-25

## 2025-01-07 DIAGNOSIS — G43.011 INTRACTABLE MIGRAINE WITHOUT AURA AND WITH STATUS MIGRAINOSUS: ICD-10-CM

## 2025-01-07 RX ORDER — IBUPROFEN 800 MG/1
TABLET ORAL
Qty: 20 TABLET | Refills: 2 | Status: SHIPPED | OUTPATIENT
Start: 2025-01-07 | End: 2026-01-07

## 2025-04-09 NOTE — PROGRESS NOTES
"Date:  4/10/2025    ?  Referring Provider:   Lissy Box PA-C    Copies of Letters to the Following:   Lissy Box PA-C    Chief Complaint:  I saw Ifrah Valentine at the [facility] for neuro-ophthalmic evaluation.   She is a 35 y.o. female with a history of epilepsy, migraines, COLLETTE, VMA OU, who presents for evaluation of transient vision changes    History:     HPI    Referred: Dr. Raymond    36 y/o female present to Neuro-ophthalmic clinic for migraines evaluation.   She present to clinic for concerns of photophobia triggering seizures. She   has to wait 30 min upon waking up to adjust to sunlight, she is unable to   drive at night due to glare/light blindness. Light causes epiphora. She   has 3-4 episodes of headaches monthly. She has floaters and flashes of   lights. No dry eyes, or eye allergies reported. She has bilateral eye   pain.     Eyemeds  No gtts  Last edited by Harry White on 4/10/2025  1:15 PM.          12/2024 Maddy Kellogg  "2/06/2024 - Interval History:  Ha's have improved greatly since starting quilipta. Has tried increased imitrex dose once thus far w/ resolution of ha noted. No longer taking ibuprofen.   Plan: contnue quilipta 60mg/d, imitrex 100mg prn, limit ibuprofen 800mg use to avoid rebound ha's, track ha's, start supplements, rtc 6 mo     09/06/2024 - Interval History:  Christine's worsened a couple of months ago, are mild to severe. At this time they are occurring 20/30 days per month lasting 1 hr - 1/2 a day. She has an associated photophobia. She has tried tizanidine which somewhat helps but causes drowsiness. She also tried imitrex 50mg but didn't help. She typically takes ibuprofen 800mg which helps, takes it 3 times total a month. Discussed multiple options.   Plan: start quilipta 60mg/d, try increasing imitrex 100mg prn, limit ibuprofen 800mg use to avoid rebound ha's, track ha's, start supplements, rtc 2-3 mo     12/01/2023 - Interval History:  Pt states ha's " "occur 3-4/30 days per month. Uses ibuprofen infrequently, does not exceed 3 days a week. Refills given. Will f/up prn.      Recommendations made at last Office Visit on 6/12/23:  Discussed symptoms appear to be consistent with migraines. Discussed this with patient. Discussed multiple options w/ pt including daily oral ppx's, increasing zonisamide, cgrp inhibitors, botox, gepants, and steroid taper. Pt defers these options and verbalizes a wish to focus on non-medication options. Discussed non-medication options such as vitamins, trigger mgmt, and neuro-modulation devices. Pt agreeable to these. Pt also states she wishes to only take ibuproefn 800mg prn for an abortive option. Counseled on risks including overuse and to limit to <2-3 days in a week, pt verbalizes her understanding.   Pt will try mag ox and trigger mgmt, she will also continue ibuprofen 800mg limited to <2-3 days in a week, and f/up prn.    "  ?  8/26/2024 epilepsy  "States ophthalmology recommended a neuro-ophthalmology eval. Chronic headache/migraine 3-4x/week described as sharp pain primarily to the L frontal region w/ associated spots in vision, N/V, and photophobia. Ibuprofen provides some relief. Otherwise, no fever, no cold symptoms, no new weakness, no chest pain, no shortness of breath, no diarrhea, no constipation, no tingling/numbness, no problems walking.   "    10/2023 Purui  "1. Vitreomacular adhesion of both eyes  Here for floater eval  Very photophobic - triggers seizures  Has VMA OU, no RT/RD on DFE  Plan: Observation  Pathology of PVD, Retinal Tear, Retinal Detachment reviewed in great detail  RD precautions discussed in detail, patient expressed understanding  RTC immediately PRN (especially ANY change flashes, floaters, vision, visual field)      2. Complex partial seizures evolving to generalized tonic-clonic seizures  Hx of seizures, most recent few weeks ago  F/b  Neurology - Dr. Kellogg last seen in June 2023 - notes reviewed " "  Has appt coming up 10/26/23  Plan: Observation for now, continue seizure meds and f/u with Neurology, will refer to Neuro-ophtho per pt request to be seen for her photophobia triggering seizures     3. Dry eye syndrome of both eyes  Mild dry eye  Rec ATs prn     RTC Neuro-ophtho for epilepsy/eye pain eval   RTC [place] prn   "  Current Medications[1]  Review of patient's allergies indicates:  No Known Allergies  Past Medical History:   Diagnosis Date    Idiopathic generalized epilepsy     Onset April 2019     History reviewed. No pertinent surgical history.  No family history on file.  Social History[2]    Examination:  She was well-appearing. She was alert and oriented. Attention span and concentration were normal. Speech, language, memory, and general knowledge were intact.      Her distance visual acuity without correction was 20/25  in the right eye and 20/25+2 in the left eye.  Her near visual acuity without correction was J3 PH NI in the right eye and J2 in the left eye.     She perceived 8/8 OD and 8/8 OS Ishihara color plates correctly. Pupils were brisk to light without an afferent defect. Ocular ductions were full. Orthophoric in primary, right, and left gaze by cross cover. There was no nystagmus. Saccades and pursuits were normal. Lids were symmetric.     Optic discs appeared normal without swelling or pallor. Pupillary dilation was not necessary for visualization of the optic disc today.     Laboratories Reviewed:     N/a  ?  Neuroimaging Reviewed:     9/2019 MRI brain wo contrast  Diffusion imaging does not reveal acute or recent ischemic stroke. No intracranial bleed is present.     Cortical sulci and ventricular volume or balance. There is no obstructive hydrocephalus or subdural hematoma.     No old stroke or focal area of encephalomalacia is appreciated.     There is no intracranial mass lesion, cerebral edema, brain swelling, or AV malformation.     Epilepsy protocol brain sequences do not reveal " mesial temporal sclerosis, focal cortical scar, heterotopic nodular implant, AV malformation, cerebral cortical dysplasia, or other likely seizure cause.     The pituitary is unremarkable. Basal subarachnoid cisterns are patent. Posterior fossa structures are preserved. The craniocervical junction is intact.     Orbits and orbital contents are intact. Paranasal sinuses, middle ears, and mastoids are clear. No cranial nerve lesion is appreciated.     Ocular Imaging, Photos, Records Reviewed:     OCT RNFL Today 4/10/2025:   Right Eye - Average RNFL 106 all segments normal   Left Eye - Average RNFL 108 all segments normal     Normal macular architecture OU    Visual Field Test 24-2 OU Today 4/10/2025: Right Eye - fixation losses 3/12, false positives 6%, false negatives 11%, MD -8.49dB, Impression OD: IT and SN depression. Left Eye - fixation losses 2/14, false positives 6%, false negatives 16%, MD -6.90dB, Impression OS: patchy moderate global depression.  ?  Impression:  Ifrah Valentine has history of epilepsy, migraines, COLLETTE, VMA OU, who presents for evaluation of transient vision change. They report light sensitivity which can at times seem to provoke either headaches or eye strain. She reports waking in the morning with epiphora. Neuro-ophthalmologic examination was notable for good visual acuities, full color vision, normal ocular motility and alignment. OCT with normal and symmetric peripapillary and macular RNFL thicknesses. Formal visual fields were notable for some IT and SN depression OD and patchy global depression OS, nonspecific.    I suspect her light sensitivity is multifactorial due to dry eye and migraines. I recommended aggressive ocular lubrication and consideration of adding FL-41 filter to glasses, needs refraction.   ?  Plan:  1. Dr. Albarran for refraction and dry eye and consideration for FL-41 filter on lenses for light sensitivity  2. Follow up in headache and epilepsy clinics as planned  3.  MRI head and orbits w/wo contrast to ensure no new structural or inflammatory process    Follow-up:  I will see her in follow-up based on response to above  ?  ?  Visit Checklist (as applicable):  1. Status of new and prior symptoms discussed? yes  2. Neuroimaging reviewed/ ordered as appropriate? yes  3. Ocular imaging and photos reviewed/ ordered as appropriate? yes  4. Plan for work-up and treatment discussed with patient? yes  5. Potential medication side-effects and monitoring plan discussed? yes  6. Review of outside medical records was performed and pertinent details are summarized in the HPI above? yes    Time spent on this encounter: 45 minutes. This includes face to face time and non-face to face time preparing to see the patient (eg, review of tests), obtaining and/or reviewing separately obtained history, documenting clinical information in the electronic or other health record, independently interpreting results and communicating results to the patient/family/caregiver, or care coordinator.      JING Baldwin  Neuro-Ophthalmology Consultant         [1]   Current Outpatient Medications   Medication Sig Dispense Refill    (Magic mouthwash) 1:1:1 diphenhydrAMINE(Benadryl) 12.5mg/5ml liq, aluminum & magnesium hydroxide-simethicone (Maalox), LIDOcaine viscous 2% Swish and spit 15 mLs every 4 (four) hours as needed (tongue laceration). 500 mL 1    DRYSOL DAB-O-MATIC 20 % external solution Apply topically nightly.      ibuprofen (ADVIL,MOTRIN) 800 MG tablet Take 1 tablet (800 mg total) by mouth every 8 (eight) hours as needed (for headache). Do not take more than 3,200 mg in a day. Or more than 2-3 days in a week 20 tablet 2    lamotrigine XR (LAMICTAL XR) 300 mg XR tablet Take 1 tablet (300 mg total) by mouth once daily. 90 tablet 3    lamotrigine XR (LAMICTAL XR) 300 mg XR tablet Take 1 tablet (300 mg total) by mouth nightly. 90 tablet 3    midazolam (NAYZILAM) 5 mg/spray (0.1 mL) Spry 1 spray by Nasal  route as needed (for prolonged seizure or seizure cluster). After 10 minutes if seizure activity continues, an additional spray can be administered in the other nostril. 2 each 2    sumatriptan (IMITREX) 100 MG tablet Take 1 tab PO at onset of migraine, can repeat in 2 hrs if needed.  No more than twice per day or 3 days/wk. 18 tablet 5    tretinoin (RETIN-A) 0.025 % cream Apply topically nightly.       No current facility-administered medications for this visit.   [2]   Social History  Socioeconomic History    Marital status: Single   Tobacco Use    Smoking status: Every Day     Current packs/day: 0.50     Average packs/day: 0.5 packs/day for 10.0 years (5.0 ttl pk-yrs)     Types: Cigarettes    Smokeless tobacco: Never   Substance and Sexual Activity    Alcohol use: Yes    Drug use: Yes     Frequency: 3.0 times per week     Types: Marijuana    Sexual activity: Never     Social Drivers of Health     Financial Resource Strain: Patient Declined (12/1/2023)    Overall Financial Resource Strain (CARDIA)     Difficulty of Paying Living Expenses: Patient declined   Food Insecurity: Patient Declined (12/1/2023)    Hunger Vital Sign     Worried About Running Out of Food in the Last Year: Patient declined     Ran Out of Food in the Last Year: Patient declined   Transportation Needs: Unmet Transportation Needs (12/1/2023)    PRAPARE - Transportation     Lack of Transportation (Medical): Yes     Lack of Transportation (Non-Medical): Yes   Physical Activity: Unknown (12/1/2023)    Exercise Vital Sign     Days of Exercise per Week: 0 days   Stress: Stress Concern Present (12/1/2023)    [employer] [place] [employer] - Occupational Stress Questionnaire     Feeling of Stress : To some extent   Housing Stability: Unknown (12/1/2023)    Housing Stability Vital Sign     Unable to Pay for Housing in the Last Year: Patient refused     Unstable Housing in the Last Year: Patient refused

## 2025-04-10 ENCOUNTER — TELEPHONE (OUTPATIENT)
Dept: OPTOMETRY | Facility: CLINIC | Age: 35
End: 2025-04-10
Payer: MEDICAID

## 2025-04-10 ENCOUNTER — CLINICAL SUPPORT (OUTPATIENT)
Dept: OPHTHALMOLOGY | Facility: CLINIC | Age: 35
End: 2025-04-10
Payer: MEDICAID

## 2025-04-10 ENCOUNTER — OFFICE VISIT (OUTPATIENT)
Dept: OPHTHALMOLOGY | Facility: CLINIC | Age: 35
End: 2025-04-10
Payer: MEDICAID

## 2025-04-10 DIAGNOSIS — H53.15 VISUAL DISTORTIONS OF SHAPE AND SIZE: Primary | ICD-10-CM

## 2025-04-10 DIAGNOSIS — H46.9 UNSPECIFIED OPTIC NEURITIS: ICD-10-CM

## 2025-04-10 PROCEDURE — 92133 CPTRZD OPH DX IMG PST SGM ON: CPT | Mod: PBBFAC | Performed by: STUDENT IN AN ORGANIZED HEALTH CARE EDUCATION/TRAINING PROGRAM

## 2025-04-10 PROCEDURE — 1159F MED LIST DOCD IN RCRD: CPT | Mod: CPTII,,, | Performed by: STUDENT IN AN ORGANIZED HEALTH CARE EDUCATION/TRAINING PROGRAM

## 2025-04-10 PROCEDURE — 1160F RVW MEDS BY RX/DR IN RCRD: CPT | Mod: CPTII,,, | Performed by: STUDENT IN AN ORGANIZED HEALTH CARE EDUCATION/TRAINING PROGRAM

## 2025-04-10 PROCEDURE — 99212 OFFICE O/P EST SF 10 MIN: CPT | Mod: PBBFAC | Performed by: STUDENT IN AN ORGANIZED HEALTH CARE EDUCATION/TRAINING PROGRAM

## 2025-04-10 PROCEDURE — 99999 PR PBB SHADOW E&M-EST. PATIENT-LVL II: CPT | Mod: PBBFAC,,, | Performed by: STUDENT IN AN ORGANIZED HEALTH CARE EDUCATION/TRAINING PROGRAM

## 2025-04-10 PROCEDURE — 92083 EXTENDED VISUAL FIELD XM: CPT | Mod: PBBFAC | Performed by: STUDENT IN AN ORGANIZED HEALTH CARE EDUCATION/TRAINING PROGRAM

## 2025-04-10 PROCEDURE — 99215 OFFICE O/P EST HI 40 MIN: CPT | Mod: S$PBB,,, | Performed by: STUDENT IN AN ORGANIZED HEALTH CARE EDUCATION/TRAINING PROGRAM

## 2025-04-10 NOTE — LETTER
[facility] [address]  [address]  [facility] [address]  Phone: [phone]  Fax: [phone]   April 10, 2025    Lissy Box PA-C  [address]  [facility] [address]    Patient: Ifrah Valentine   MR Number: [id number]   YOB: 1990   Date of Visit: 4/10/2025       Dear Dr. Box :    Thank you for referring Ifrah Valentine to me for evaluation. Here is my assessment and plan of care:       Impression:  Ifrah Valentine has history of epilepsy, migraines, COLLETTE, VMA OU, who presents for evaluation of transient vision change. They report light sensitivity which can at times seem to provoke either headaches or eye strain. She reports waking in the morning with epiphora. Neuro-ophthalmologic examination was notable for good visual acuities, full color vision, normal ocular motility and alignment. OCT with normal and symmetric peripapillary and macular RNFL thicknesses. Formal visual fields were notable for some IT and SN depression OD and patchy global depression OS, nonspecific.    I suspect her light sensitivity is multifactorial due to dry eye and migraines. I recommended aggressive ocular lubrication and consideration of adding FL-41 filter to glasses, needs refraction.      Plan:  1. Dr. Albarran for refraction and dry eye and consideration for FL-41 filter on lenses for light sensitivity  2. Follow up in headache and epilepsy clinics as planned  3. MRI head and orbits w/wo contrast to ensure no new structural or inflammatory process    Follow-up:  I will see her in follow-up based on response to above    If you have questions, please do not hesitate to call me. I look forward to following Ms. Ifrah Valentine along with you.    Sincerely,        Mckayla Dodge MD       CC  Maddy Kellogg PA-C

## 2025-04-10 NOTE — TELEPHONE ENCOUNTER
----- Message from Tech Maricsa sent at 4/10/2025  1:42 PM CDT -----  Please schedule appt for dry eyes and refraction. Thank you.

## 2025-04-15 ENCOUNTER — OFFICE VISIT (OUTPATIENT)
Dept: OPTOMETRY | Facility: CLINIC | Age: 35
End: 2025-04-15
Payer: MEDICAID

## 2025-04-15 DIAGNOSIS — H53.143 PHOTOPHOBIA OF BOTH EYES: Primary | ICD-10-CM

## 2025-04-15 PROCEDURE — 99999 PR PBB SHADOW E&M-EST. PATIENT-LVL II: CPT | Mod: PBBFAC,,, | Performed by: OPTOMETRIST

## 2025-04-15 PROCEDURE — 92015 DETERMINE REFRACTIVE STATE: CPT | Mod: ,,, | Performed by: OPTOMETRIST

## 2025-04-15 PROCEDURE — 1159F MED LIST DOCD IN RCRD: CPT | Mod: CPTII,,, | Performed by: OPTOMETRIST

## 2025-04-15 PROCEDURE — 99213 OFFICE O/P EST LOW 20 MIN: CPT | Mod: S$PBB,,, | Performed by: OPTOMETRIST

## 2025-04-15 PROCEDURE — 99212 OFFICE O/P EST SF 10 MIN: CPT | Mod: PBBFAC | Performed by: OPTOMETRIST

## 2025-04-15 NOTE — PROGRESS NOTES
HPI    Patient is here today for dry eye eval and refraction. States that she has   has pain in her eyes due to straining. Reports redness. Currently not   using any drops.   Last edited by Giovana Albarran, OD on 4/15/2025 12:48 PM.            Assessment /Plan     For exam results, see Encounter Report.    Photophobia of both eyes      Educated pt on today's mild dry eye findings. I do not believe the majority of pt's symptoms are being caused by COLLETTE. I believe they are neuro based and discussed with pt filling filter order prescribed by Dr. Dodge at [facility].      Eyeglass Final Rx       Eyeglass Final Rx         Sphere Cylinder Axis    Right -0.50 +0.50 045    Left -0.25 Sphere       Type: SVL *with filter prescribed by Dr. Dodge    Expiration Date: 4/15/2026                   C PRN

## 2025-05-15 ENCOUNTER — TELEPHONE (OUTPATIENT)
Facility: CLINIC | Age: 35
End: 2025-05-15
Payer: MEDICAID

## 2025-06-02 ENCOUNTER — TELEPHONE (OUTPATIENT)
Dept: NEUROLOGY | Facility: CLINIC | Age: 35
End: 2025-06-02
Payer: MEDICAID

## 2025-07-30 NOTE — TELEPHONE ENCOUNTER
----- Message from Kim Gonzalez MD PhD sent at 2/27/2024 12:50 PM CST -----  Regarding: FW: pt appt  Follow-up with Catalina walter.  ----- Message -----  From: Dre Toribio MD  Sent: 2/27/2024   7:36 AM CST  To: Lisa Al[facility] Staff  Subject: pt appt                                          Hi     Sorry to bother you one of your pts put in an evisit for her seizures and I recommended she make an appointment with Dr. Gonzalez or one of her colleagues if possible. If you could help with an appt that would be very helpful!       
Called to schedule appointment. No answer, voice mail is full   
You can access the FollowMyHealth Patient Portal offered by [facility] by registering at the following website: [email or web address]. By joining [employer]’s FollowMyHealth portal, you will also be able to view your health information using other applications (apps) compatible with our system.